# Patient Record
Sex: MALE | Race: BLACK OR AFRICAN AMERICAN | Employment: UNEMPLOYED | URBAN - METROPOLITAN AREA
[De-identification: names, ages, dates, MRNs, and addresses within clinical notes are randomized per-mention and may not be internally consistent; named-entity substitution may affect disease eponyms.]

---

## 2018-05-08 ENCOUNTER — HOSPITAL ENCOUNTER (OUTPATIENT)
Dept: LAB | Age: 63
Discharge: HOME | End: 2018-05-08
Payer: COMMERCIAL

## 2018-05-08 LAB — VALPROATE SERPL-MCNC: 72 UG/ML (ref 50–100)

## 2018-05-08 PROCEDURE — 36415 COLL VENOUS BLD VENIPUNCTURE: CPT | Performed by: PSYCHIATRY & NEUROLOGY

## 2018-05-08 PROCEDURE — 80164 ASSAY DIPROPYLACETIC ACD TOT: CPT | Performed by: PSYCHIATRY & NEUROLOGY

## 2018-05-10 LAB
FAX TO INFO,FAXT: NORMAL
FAX TO NUMBER,FAXN: NORMAL

## 2018-11-01 ENCOUNTER — HOSPITAL ENCOUNTER (OUTPATIENT)
Dept: CT IMAGING | Age: 63
Discharge: HOME | End: 2018-11-01
Attending: PHYSICIAN ASSISTANT
Payer: MEDICARE

## 2018-11-01 DIAGNOSIS — R10.32 LLQ ABDOMINAL PAIN: ICD-10-CM

## 2018-11-01 PROCEDURE — 74177 CT ABD & PELVIS W/CONTRAST: CPT

## 2018-11-01 PROCEDURE — 74011000255 HC RX REV CODE- 255

## 2018-11-01 PROCEDURE — 74011636320 HC RX REV CODE- 636/320

## 2018-11-01 RX ORDER — BARIUM SULFATE 20 MG/ML
900 SUSPENSION ORAL
Status: COMPLETED | OUTPATIENT
Start: 2018-11-01 | End: 2018-11-01

## 2018-11-01 RX ADMIN — BARIUM SULFATE 900 ML: 20 SUSPENSION ORAL at 13:49

## 2018-11-01 RX ADMIN — IOPAMIDOL 100 ML: 612 INJECTION, SOLUTION INTRAVENOUS at 13:49

## 2018-12-26 ENCOUNTER — HOSPITAL ENCOUNTER (OUTPATIENT)
Dept: CT IMAGING | Age: 63
Discharge: HOME | End: 2018-12-26
Attending: FAMILY MEDICINE
Payer: MEDICARE

## 2018-12-26 DIAGNOSIS — R10.32 LEFT LOWER QUADRANT PAIN: ICD-10-CM

## 2018-12-26 PROCEDURE — 74177 CT ABD & PELVIS W/CONTRAST: CPT

## 2018-12-26 PROCEDURE — 74011000255 HC RX REV CODE- 255

## 2018-12-26 PROCEDURE — 74011636320 HC RX REV CODE- 636/320

## 2018-12-26 RX ORDER — BARIUM SULFATE 20 MG/ML
900 SUSPENSION ORAL
Status: DISCONTINUED | OUTPATIENT
Start: 2018-12-26 | End: 2018-12-26

## 2018-12-26 RX ORDER — BARIUM SULFATE 20 MG/ML
450 SUSPENSION ORAL
Status: COMPLETED | OUTPATIENT
Start: 2018-12-26 | End: 2018-12-26

## 2018-12-26 RX ADMIN — IOPAMIDOL 100 ML: 612 INJECTION, SOLUTION INTRAVENOUS at 15:56

## 2018-12-26 RX ADMIN — BARIUM SULFATE 450 ML: 20 SUSPENSION ORAL at 15:56

## 2019-09-26 ENCOUNTER — APPOINTMENT (OUTPATIENT)
Dept: GENERAL RADIOLOGY | Age: 64
DRG: 948 | End: 2019-09-26
Attending: EMERGENCY MEDICINE
Payer: MEDICARE

## 2019-09-26 ENCOUNTER — APPOINTMENT (OUTPATIENT)
Dept: VASCULAR SURGERY | Age: 64
DRG: 948 | End: 2019-09-26
Attending: EMERGENCY MEDICINE
Payer: MEDICARE

## 2019-09-26 ENCOUNTER — APPOINTMENT (OUTPATIENT)
Dept: CT IMAGING | Age: 64
DRG: 948 | End: 2019-09-26
Attending: EMERGENCY MEDICINE
Payer: MEDICARE

## 2019-09-26 ENCOUNTER — HOSPITAL ENCOUNTER (INPATIENT)
Age: 64
LOS: 4 days | Discharge: HOME HEALTH | DRG: 948 | End: 2019-09-30
Attending: EMERGENCY MEDICINE | Admitting: FAMILY MEDICINE
Payer: MEDICARE

## 2019-09-26 ENCOUNTER — APPOINTMENT (OUTPATIENT)
Dept: NUCLEAR MEDICINE | Age: 64
DRG: 948 | End: 2019-09-26
Attending: EMERGENCY MEDICINE
Payer: MEDICARE

## 2019-09-26 DIAGNOSIS — R60.0 BILATERAL LEG EDEMA: ICD-10-CM

## 2019-09-26 DIAGNOSIS — R06.02 SOB (SHORTNESS OF BREATH): Primary | ICD-10-CM

## 2019-09-26 PROBLEM — R60.9 EDEMA: Status: ACTIVE | Noted: 2019-09-26

## 2019-09-26 LAB
ALBUMIN SERPL-MCNC: 4 G/DL (ref 3.4–5)
ALBUMIN/GLOB SERPL: 1.1 {RATIO} (ref 0.8–1.7)
ALP SERPL-CCNC: 76 U/L (ref 45–117)
ALT SERPL-CCNC: 18 U/L (ref 16–61)
ANION GAP SERPL CALC-SCNC: 7 MMOL/L (ref 3–18)
APPEARANCE UR: CLEAR
AST SERPL-CCNC: 16 U/L (ref 10–38)
BASOPHILS # BLD: 0 K/UL (ref 0–0.1)
BASOPHILS NFR BLD: 0 % (ref 0–2)
BILIRUB SERPL-MCNC: 0.7 MG/DL (ref 0.2–1)
BILIRUB UR QL: NEGATIVE
BNP SERPL-MCNC: 26 PG/ML (ref 0–900)
BUN SERPL-MCNC: 27 MG/DL (ref 7–18)
BUN/CREAT SERPL: 16 (ref 12–20)
CALCIUM SERPL-MCNC: 9.2 MG/DL (ref 8.5–10.1)
CHLORIDE SERPL-SCNC: 107 MMOL/L (ref 100–111)
CK MB CFR SERPL CALC: 0.5 % (ref 0–4)
CK MB SERPL-MCNC: 2 NG/ML (ref 5–25)
CK SERPL-CCNC: 408 U/L (ref 39–308)
CO2 SERPL-SCNC: 26 MMOL/L (ref 21–32)
COLOR UR: YELLOW
CREAT SERPL-MCNC: 1.64 MG/DL (ref 0.6–1.3)
D DIMER PPP FEU-MCNC: 1.02 UG/ML(FEU)
DIFFERENTIAL METHOD BLD: ABNORMAL
EOSINOPHIL # BLD: 0 K/UL (ref 0–0.4)
EOSINOPHIL NFR BLD: 1 % (ref 0–5)
ERYTHROCYTE [DISTWIDTH] IN BLOOD BY AUTOMATED COUNT: 12.3 % (ref 11.6–14.5)
GLOBULIN SER CALC-MCNC: 3.7 G/DL (ref 2–4)
GLUCOSE SERPL-MCNC: 87 MG/DL (ref 74–99)
GLUCOSE UR STRIP.AUTO-MCNC: NEGATIVE MG/DL
HCT VFR BLD AUTO: 41.4 % (ref 36–48)
HGB BLD-MCNC: 13.3 G/DL (ref 13–16)
HGB UR QL STRIP: NEGATIVE
KETONES UR QL STRIP.AUTO: NEGATIVE MG/DL
LEUKOCYTE ESTERASE UR QL STRIP.AUTO: NEGATIVE
LYMPHOCYTES # BLD: 1.7 K/UL (ref 0.9–3.6)
LYMPHOCYTES NFR BLD: 29 % (ref 21–52)
MCH RBC QN AUTO: 28.6 PG (ref 24–34)
MCHC RBC AUTO-ENTMCNC: 32.1 G/DL (ref 31–37)
MCV RBC AUTO: 89 FL (ref 74–97)
MONOCYTES # BLD: 0.5 K/UL (ref 0.05–1.2)
MONOCYTES NFR BLD: 8 % (ref 3–10)
NEUTS SEG # BLD: 3.5 K/UL (ref 1.8–8)
NEUTS SEG NFR BLD: 62 % (ref 40–73)
NITRITE UR QL STRIP.AUTO: NEGATIVE
PH UR STRIP: 7 [PH] (ref 5–8)
PLATELET # BLD AUTO: 184 K/UL (ref 135–420)
PMV BLD AUTO: 9.3 FL (ref 9.2–11.8)
POTASSIUM SERPL-SCNC: 3.6 MMOL/L (ref 3.5–5.5)
PROT SERPL-MCNC: 7.7 G/DL (ref 6.4–8.2)
PROT UR STRIP-MCNC: NEGATIVE MG/DL
RBC # BLD AUTO: 4.65 M/UL (ref 4.7–5.5)
SODIUM SERPL-SCNC: 140 MMOL/L (ref 136–145)
SP GR UR REFRACTOMETRY: 1.02 (ref 1–1.03)
TROPONIN I SERPL-MCNC: <0.02 NG/ML (ref 0–0.04)
TROPONIN I SERPL-MCNC: <0.02 NG/ML (ref 0–0.04)
UROBILINOGEN UR QL STRIP.AUTO: 1 EU/DL (ref 0.2–1)
WBC # BLD AUTO: 5.7 K/UL (ref 4.6–13.2)

## 2019-09-26 PROCEDURE — 82550 ASSAY OF CK (CPK): CPT

## 2019-09-26 PROCEDURE — 74176 CT ABD & PELVIS W/O CONTRAST: CPT

## 2019-09-26 PROCEDURE — 71046 X-RAY EXAM CHEST 2 VIEWS: CPT

## 2019-09-26 PROCEDURE — 81003 URINALYSIS AUTO W/O SCOPE: CPT

## 2019-09-26 PROCEDURE — 93970 EXTREMITY STUDY: CPT

## 2019-09-26 PROCEDURE — 74011250636 HC RX REV CODE- 250/636: Performed by: EMERGENCY MEDICINE

## 2019-09-26 PROCEDURE — 65660000000 HC RM CCU STEPDOWN

## 2019-09-26 PROCEDURE — 85379 FIBRIN DEGRADATION QUANT: CPT

## 2019-09-26 PROCEDURE — 71250 CT THORAX DX C-: CPT

## 2019-09-26 PROCEDURE — 80053 COMPREHEN METABOLIC PANEL: CPT

## 2019-09-26 PROCEDURE — 96374 THER/PROPH/DIAG INJ IV PUSH: CPT

## 2019-09-26 PROCEDURE — 99285 EMERGENCY DEPT VISIT HI MDM: CPT

## 2019-09-26 PROCEDURE — 85025 COMPLETE CBC W/AUTO DIFF WBC: CPT

## 2019-09-26 PROCEDURE — 74011250637 HC RX REV CODE- 250/637: Performed by: EMERGENCY MEDICINE

## 2019-09-26 PROCEDURE — 94762 N-INVAS EAR/PLS OXIMTRY CONT: CPT

## 2019-09-26 PROCEDURE — 83880 ASSAY OF NATRIURETIC PEPTIDE: CPT

## 2019-09-26 PROCEDURE — 93005 ELECTROCARDIOGRAM TRACING: CPT

## 2019-09-26 RX ORDER — TAMSULOSIN HYDROCHLORIDE 0.4 MG/1
0.4 CAPSULE ORAL DAILY
COMMUNITY

## 2019-09-26 RX ORDER — BUSPIRONE HYDROCHLORIDE 5 MG/1
5 TABLET ORAL
COMMUNITY

## 2019-09-26 RX ORDER — AMLODIPINE AND BENAZEPRIL HYDROCHLORIDE 5; 10 MG/1; MG/1
1 CAPSULE ORAL DAILY
Status: ON HOLD | COMMUNITY
End: 2019-09-27

## 2019-09-26 RX ORDER — PANTOPRAZOLE SODIUM 40 MG/1
40 GRANULE, DELAYED RELEASE ORAL DAILY
Status: ON HOLD | COMMUNITY
End: 2019-09-27

## 2019-09-26 RX ORDER — GUAIFENESIN 100 MG/5ML
324 LIQUID (ML) ORAL
Status: COMPLETED | OUTPATIENT
Start: 2019-09-26 | End: 2019-09-26

## 2019-09-26 RX ORDER — AMLODIPINE BESYLATE 10 MG/1
5 TABLET ORAL DAILY
Status: ON HOLD | COMMUNITY
End: 2019-09-28

## 2019-09-26 RX ORDER — FLUOXETINE HYDROCHLORIDE 40 MG/1
40 CAPSULE ORAL DAILY
COMMUNITY

## 2019-09-26 RX ORDER — FUROSEMIDE 10 MG/ML
40 INJECTION INTRAMUSCULAR; INTRAVENOUS
Status: COMPLETED | OUTPATIENT
Start: 2019-09-26 | End: 2019-09-26

## 2019-09-26 RX ORDER — OLANZAPINE 5 MG/1
5 TABLET ORAL
COMMUNITY

## 2019-09-26 RX ORDER — TOPIRAMATE 100 MG/1
100 TABLET, FILM COATED ORAL 2 TIMES DAILY
COMMUNITY

## 2019-09-26 RX ORDER — ATORVASTATIN CALCIUM 10 MG/1
10 TABLET, FILM COATED ORAL DAILY
COMMUNITY

## 2019-09-26 RX ORDER — LORAZEPAM 0.5 MG/1
0.5 TABLET ORAL 3 TIMES DAILY
COMMUNITY

## 2019-09-26 RX ADMIN — ASPIRIN 81 MG 324 MG: 81 TABLET ORAL at 22:16

## 2019-09-26 RX ADMIN — FUROSEMIDE 40 MG: 10 INJECTION, SOLUTION INTRAMUSCULAR; INTRAVENOUS at 17:59

## 2019-09-26 NOTE — ED TRIAGE NOTES
Pt has had worsening shortness of breath over past few weeks. Seen by pcp who believes it may be chf. Started on po lasix.  Sob and swelling in legs not improving
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
## 2019-09-27 ENCOUNTER — APPOINTMENT (OUTPATIENT)
Dept: NON INVASIVE DIAGNOSTICS | Age: 64
DRG: 948 | End: 2019-09-27
Attending: FAMILY MEDICINE
Payer: MEDICARE

## 2019-09-27 LAB
ALBUMIN SERPL-MCNC: 3.9 G/DL (ref 3.4–5)
ALBUMIN/GLOB SERPL: 1.1 {RATIO} (ref 0.8–1.7)
ALP SERPL-CCNC: 80 U/L (ref 45–117)
ALT SERPL-CCNC: 18 U/L (ref 16–61)
ANION GAP SERPL CALC-SCNC: 7 MMOL/L (ref 3–18)
AST SERPL-CCNC: 20 U/L (ref 10–38)
ATRIAL RATE: 61 BPM
BILIRUB SERPL-MCNC: 0.7 MG/DL (ref 0.2–1)
BUN SERPL-MCNC: 26 MG/DL (ref 7–18)
BUN/CREAT SERPL: 19 (ref 12–20)
CALCIUM SERPL-MCNC: 9.1 MG/DL (ref 8.5–10.1)
CALCULATED P AXIS, ECG09: 64 DEGREES
CALCULATED R AXIS, ECG10: 2 DEGREES
CALCULATED T AXIS, ECG11: 25 DEGREES
CHLORIDE SERPL-SCNC: 107 MMOL/L (ref 100–111)
CK MB CFR SERPL CALC: 0.3 % (ref 0–4)
CK MB SERPL-MCNC: 2 NG/ML (ref 5–25)
CK SERPL-CCNC: 748 U/L (ref 39–308)
CO2 SERPL-SCNC: 28 MMOL/L (ref 21–32)
CREAT SERPL-MCNC: 1.37 MG/DL (ref 0.6–1.3)
DIAGNOSIS, 93000: NORMAL
ECHO AO ASC DIAM: 2.95 CM
ECHO AO ROOT DIAM: 2.61 CM
ECHO AV MEAN GRADIENT: 2.5 MMHG
ECHO AV PEAK GRADIENT: 4.6 MMHG
ECHO AV PEAK VELOCITY: 106.92 CM/S
ECHO AV VTI: 22.31 CM
ECHO LA MAJOR AXIS: 3.32 CM
ECHO LA VOL 2C: 22.45 ML (ref 18–58)
ECHO LA VOL 4C: 24.8 ML (ref 18–58)
ECHO LA VOL BP: 28.95 ML (ref 18–58)
ECHO LA VOL/BSA BIPLANE: 15.03 ML/M2 (ref 16–28)
ECHO LA VOLUME INDEX A2C: 11.65 ML/M2 (ref 16–28)
ECHO LA VOLUME INDEX A4C: 12.87 ML/M2 (ref 16–28)
ECHO LV E' LATERAL VELOCITY: 8 CM/S
ECHO LV E' SEPTAL VELOCITY: 6 CM/S
ECHO LV EDV A2C: 47.9 ML
ECHO LV EDV A4C: 63.9 ML
ECHO LV EDV BP: 56.2 ML (ref 67–155)
ECHO LV EDV INDEX A4C: 33.2 ML/M2
ECHO LV EDV INDEX BP: 29.2 ML/M2
ECHO LV EDV NDEX A2C: 24.9 ML/M2
ECHO LV EJECTION FRACTION A2C: 61 %
ECHO LV EJECTION FRACTION A4C: 64 %
ECHO LV EJECTION FRACTION BIPLANE: 62.1 % (ref 55–100)
ECHO LV ESV A2C: 18.8 ML
ECHO LV ESV A4C: 23 ML
ECHO LV ESV BP: 21.3 ML (ref 22–58)
ECHO LV ESV INDEX A2C: 9.8 ML/M2
ECHO LV ESV INDEX A4C: 11.9 ML/M2
ECHO LV ESV INDEX BP: 11.1 ML/M2
ECHO LV INTERNAL DIMENSION DIASTOLIC: 4.24 CM (ref 4.2–5.9)
ECHO LV INTERNAL DIMENSION SYSTOLIC: 2.02 CM
ECHO LV IVSD: 1.16 CM (ref 0.6–1)
ECHO LV MASS 2D: 184.2 G (ref 88–224)
ECHO LV MASS INDEX 2D: 95.6 G/M2 (ref 49–115)
ECHO LV POSTERIOR WALL DIASTOLIC: 1.03 CM (ref 0.6–1)
ECHO LVOT DIAM: 1.99 CM
ECHO MV A VELOCITY: 76.27 CM/S
ECHO MV AREA PHT: 18.8 CM2
ECHO MV E DECELERATION TIME (DT): 40.3 MS
ECHO MV E VELOCITY: 67.28 CM/S
ECHO MV E/A RATIO: 0.88
ECHO MV E/E' LATERAL: 8.41
ECHO MV E/E' RATIO (AVERAGED): 9.81
ECHO MV E/E' SEPTAL: 11.21
ECHO MV PRESSURE HALF TIME (PHT): 11.7 MS
ECHO PV MAX VELOCITY: 103.88 CM/S
ECHO PV PEAK GRADIENT: 4.3 MMHG
ECHO RA AREA 4C: 10.44 CM2
ECHO RV INTERNAL DIMENSION: 3.6 CM
ECHO TV REGURGITANT MAX VELOCITY: 160.15 CM/S
ECHO TV REGURGITANT PEAK GRADIENT: 10.3 MMHG
ERYTHROCYTE [DISTWIDTH] IN BLOOD BY AUTOMATED COUNT: 12.3 % (ref 11.6–14.5)
GLOBULIN SER CALC-MCNC: 3.5 G/DL (ref 2–4)
GLUCOSE SERPL-MCNC: 80 MG/DL (ref 74–99)
HCT VFR BLD AUTO: 45 % (ref 36–48)
HGB BLD-MCNC: 14.7 G/DL (ref 13–16)
MCH RBC QN AUTO: 29.1 PG (ref 24–34)
MCHC RBC AUTO-ENTMCNC: 32.7 G/DL (ref 31–37)
MCV RBC AUTO: 89.1 FL (ref 74–97)
P-R INTERVAL, ECG05: 152 MS
PLATELET # BLD AUTO: 175 K/UL (ref 135–420)
PMV BLD AUTO: 9.9 FL (ref 9.2–11.8)
POTASSIUM SERPL-SCNC: 3.4 MMOL/L (ref 3.5–5.5)
PROT SERPL-MCNC: 7.4 G/DL (ref 6.4–8.2)
Q-T INTERVAL, ECG07: 410 MS
QRS DURATION, ECG06: 84 MS
QTC CALCULATION (BEZET), ECG08: 412 MS
RBC # BLD AUTO: 5.05 M/UL (ref 4.7–5.5)
SODIUM SERPL-SCNC: 142 MMOL/L (ref 136–145)
TROPONIN I SERPL-MCNC: <0.02 NG/ML (ref 0–0.04)
VENTRICULAR RATE, ECG03: 61 BPM
WBC # BLD AUTO: 4.9 K/UL (ref 4.6–13.2)

## 2019-09-27 PROCEDURE — 65660000000 HC RM CCU STEPDOWN

## 2019-09-27 PROCEDURE — 93306 TTE W/DOPPLER COMPLETE: CPT

## 2019-09-27 PROCEDURE — 85027 COMPLETE CBC AUTOMATED: CPT

## 2019-09-27 PROCEDURE — 74011250636 HC RX REV CODE- 250/636: Performed by: FAMILY MEDICINE

## 2019-09-27 PROCEDURE — 82550 ASSAY OF CK (CPK): CPT

## 2019-09-27 PROCEDURE — 80053 COMPREHEN METABOLIC PANEL: CPT

## 2019-09-27 PROCEDURE — 36415 COLL VENOUS BLD VENIPUNCTURE: CPT

## 2019-09-27 RX ORDER — HEPARIN SODIUM 5000 [USP'U]/ML
5000 INJECTION, SOLUTION INTRAVENOUS; SUBCUTANEOUS EVERY 8 HOURS
Status: DISCONTINUED | OUTPATIENT
Start: 2019-09-27 | End: 2019-09-30 | Stop reason: HOSPADM

## 2019-09-27 RX ORDER — CALCIUM POLYCARBOPHIL 625 MG
1250 TABLET ORAL
Status: ON HOLD | COMMUNITY
End: 2019-09-27 | Stop reason: CLARIF

## 2019-09-27 RX ORDER — POTASSIUM CHLORIDE 750 MG/1
10 TABLET, FILM COATED, EXTENDED RELEASE ORAL DAILY
Status: ON HOLD | COMMUNITY
End: 2019-09-28

## 2019-09-27 RX ORDER — FUROSEMIDE 10 MG/ML
20 INJECTION INTRAMUSCULAR; INTRAVENOUS DAILY
Status: DISCONTINUED | OUTPATIENT
Start: 2019-09-27 | End: 2019-09-29

## 2019-09-27 RX ORDER — BROMPHENIRAMINE MALEATE, DEXTROMETHORPHAN HBR, PHENYLEPHRINE HCL, DIPHENHYDRAMINE HCL, PHENYLEPHRINE HCL 0.52G
2 KIT ORAL
COMMUNITY

## 2019-09-27 RX ORDER — FUROSEMIDE 20 MG/1
40 TABLET ORAL DAILY
Refills: 0 | Status: ON HOLD | COMMUNITY
Start: 2019-09-05 | End: 2019-09-28

## 2019-09-27 RX ORDER — PANTOPRAZOLE SODIUM 40 MG/1
40 TABLET, DELAYED RELEASE ORAL DAILY
COMMUNITY

## 2019-09-27 RX ORDER — DIVALPROEX SODIUM 500 MG/1
500 TABLET, DELAYED RELEASE ORAL EVERY 12 HOURS
COMMUNITY

## 2019-09-27 RX ORDER — IBUPROFEN 600 MG/1
600 TABLET ORAL
Status: ON HOLD | COMMUNITY
End: 2019-09-28

## 2019-09-27 RX ORDER — MECLIZINE HYDROCHLORIDE 25 MG/1
25 TABLET ORAL
COMMUNITY

## 2019-09-27 RX ADMIN — FUROSEMIDE 20 MG: 10 INJECTION, SOLUTION INTRAMUSCULAR; INTRAVENOUS at 04:51

## 2019-09-27 RX ADMIN — HEPARIN SODIUM 5000 UNITS: 5000 INJECTION, SOLUTION INTRAVENOUS; SUBCUTANEOUS at 21:42

## 2019-09-27 RX ADMIN — HEPARIN SODIUM 5000 UNITS: 5000 INJECTION, SOLUTION INTRAVENOUS; SUBCUTANEOUS at 13:49

## 2019-09-27 RX ADMIN — HEPARIN SODIUM 5000 UNITS: 5000 INJECTION, SOLUTION INTRAVENOUS; SUBCUTANEOUS at 02:16

## 2019-09-27 NOTE — PROGRESS NOTES
Problem: Pain  Goal: *Control of Pain  Outcome: Progressing Towards Goal  Goal: *PALLIATIVE CARE:  Alleviation of Pain  Outcome: Progressing Towards Goal     Problem: Patient Education: Go to Patient Education Activity  Goal: Patient/Family Education  Outcome: Progressing Towards Goal     Problem: Falls - Risk of  Goal: *Absence of Falls  Description  Document Almita Hilliard Fall Risk and appropriate interventions in the flowsheet.   Outcome: Progressing Towards Goal  Note:   Fall Risk Interventions:                 Elimination Interventions: Call light in reach, Patient to call for help with toileting needs              Problem: Patient Education: Go to Patient Education Activity  Goal: Patient/Family Education  Outcome: Progressing Towards Goal

## 2019-09-27 NOTE — PROGRESS NOTES
0731:Received verbal bedside report from off going nurse MANUELITO Pickering R.N. Patient care received. Patient alert and oriented x 4 with periodic confusion. Patient resting in bed denies pain. Patient stable. Call light with in reach bed in lowest position. 1119: Made  aware patient's potassium level was 3.4. No further orders received at this time.

## 2019-09-27 NOTE — PROGRESS NOTES
Problem: Pain  Goal: *Control of Pain  Outcome: Progressing Towards Goal  Goal: *PALLIATIVE CARE:  Alleviation of Pain  Outcome: Progressing Towards Goal     Problem: Patient Education: Go to Patient Education Activity  Goal: Patient/Family Education  Outcome: Progressing Towards Goal     Problem: Patient Education: Go to Patient Education Activity  Goal: Patient/Family Education  Outcome: Progressing Towards Goal     Problem:  Activity Intolerance  Goal: *Oxygen saturation during activity within specified parameters  Outcome: Progressing Towards Goal  Goal: *Able to remain out of bed as prescribed  Outcome: Progressing Towards Goal     Problem: Patient Education: Go to Patient Education Activity  Goal: Patient/Family Education  Outcome: Progressing Towards Goal

## 2019-09-27 NOTE — PROGRESS NOTES
2230 : TRANSFER - IN REPORT:    Verbal report received from JONATHAN Candelaria RN(name) on Kenneth Urrutia  being received from ED(unit) for routine progression of care     Report consisted of patients Situation, Background, Assessment and   Recommendations(SBAR). Information from the following report(s) SBAR, Kardex, ED Summary, MAR, Accordion and Recent Results was reviewed with the receiving nurse. Opportunity for questions and clarification was provided. Assessment completed upon patients arrival to unit and care assumed. 0005: Purposeful rounding completed. Patient resting comfortably in bed in no acute distress. Call light is within reach. 1334: I Spoke with caregiver and she will PTA medications list.     0730: Shift was uneventful. Patient resting comfortably in bed at shift change. Bedside and Verbal shift change report given to [address] (oncoming nurse) by Favian Santana RN (offgoing nurse). Report included the following information SBAR, Kardex, Intake/Output, MAR, Accordion, Recent Results and Med Rec Status.

## 2019-09-27 NOTE — PROGRESS NOTES
Admission Medication Reconciliation has been performed on this patient  admitted through the ED yesterday consisting of interview of the patient regarding their PTA Home Medication List, Allergies and PMH as well as obtaining outpatient pharmacy information. Chief complaint:   Chief Complaint   Patient presents with    Shortness of Breath       Pt has PMH of   Past Medical History:   Diagnosis Date    Psychiatric disorder     ID       Allergies consist of: Patient has no known allergies. Pt currently unavailable to interview  Patient did not provide a written list of home medications. Added/updated outpatient pharmacy to include: [facility] and [facility] was not marked complete and did  require modification. PAML has yet to be reviewed by prescriber. Chart notes state pt is poor historian and awaiting caregiver to provide home med list.     Medication Compliance Issues and/or Medication Concerns:   Unable to obtain med list from PCP as had to leave a msg d/t all RN's in a staff meeting. Spoke tabitha/ Kailey @ [address] [place] [address]  [phone] and RPh at [facility] to obtain current RX info. Updated PAML accordingly. Pt gets meds from [facility], [facility]. Dr. Ines Ontiveros and mental health provider Dr. Clementina Caballero [phone]. Will notify RN that Marilou Ledbetter is as complete as possible.              Papi Garay [facility], [place]    Clinical Pharmacist  [phone]

## 2019-09-27 NOTE — PROGRESS NOTES
Obtained MAR from caregiver and interviewed her (she admitted herself today to the ED w/ ?sinus infection?). She is a poor historian as she is in pain. The [facility] given to me has med administrations documented as being given up to tomorrow's date? ? Pt was admitted here yesterday. Lasix and Kdur do not appear on the [facility]. MAR has flomax BID on it yet PCP and outpt pharmacies have listed daily? Spoke with CRISTÓBAL Campos at Dr. Garrett Barrett office to confirm doses of lasix, kdur, norvasc and flomax. Spoke w/ Dr. Tray Deras regarding Erie Garbe now complete.     [address] [place] [address] Pharmacist  [phone]

## 2019-09-27 NOTE — PROGRESS NOTES
Reason for Admission:    Rachelle Agarwal is a 59 y.o. male with PMHX of psychiatric disorder who presents to the emergency department C/O SOB. Associated sxs include LE edema. Pt denies chest pain, and any other sxs or complaints. Patient is being followed by Dr. Cummins Call for progressive edema. He said progressive edema with shortness of breath over the last 2 weeks with orthopnea. He was given Lasix without improvement.     PCP: Jag Deshpande MD                    RRAT Score:       9              Plan for utilizing home health:     tbd                     Current Advanced Directive/Advance Care Plan: not on file                         Transition of Care Plan:          Met with patient at bedside. states he use to live in his own home but currently lives with a couple. Patient states he has medicare for insurance. He has Dr. Cummins Call for pcp. He cannot remember his psychiatrist name. Cm attempted to call    Darek Solomon [phone]     However unabe to leave message. Patient reports he plans to return home when d/c. Patient reports he is IADL's. Cm will continue to follow no pans for dc today  Care Management Interventions  PCP Verified by CM: Yes  Transition of Care Consult (CM Consult): Discharge Planning  Current Support Network:  Other  Plan discussed with Pt/Family/Caregiver: Yes  Freedom of Choice Offered: Yes  Discharge Location  Discharge Placement: Other:

## 2019-09-27 NOTE — PROGRESS NOTES
NUTRITION SCREENING      RD ASSESSMENT/PLAN:     Diet:  regular Height: 5' 5\" (165.1 cm)     Food Allergies: n/a Weight: 85.3 kg (188 lb)    PO Intake:  No data found. BMI: 31.3 kg/m^2 is  obese (30%-39.9% BMI)      PMH: psychiatric disorder    Current Hospital Problems: edema, SOB     Nutrition intervention not currently indicated. Pt is not at nutritional risk at this time. Will rescreen per policy.      REASON FOR ASSESSMENT:   [x]  RN Referral:    [x] MST score >/=2  Malnutrition Screening Tool (MST):  Recently Lost Weight Without Trying: Unsure     Eating Poorly Due to Decreased Appetite: No  MST Score: 2    [] Enteral/Parenteral Nutrition PTA   [] Pregnant (Not in Labor):  [] Gestational DM     [] Multigestation   [] Pressure Ulcer/Wound Care needs  [] Positive Nutrition Screen:  [] BMI <18.5  [] NPO/Clear Liquid Diet > 5 days (>3 days in ICU)  [] New Order for TPN  [] LOS  [] ICU admission      Christy Arguello RD  Pager: [phone]

## 2019-09-27 NOTE — ROUTINE PROCESS
TRANSFER - OUT REPORT: 
 
Verbal report given to Ann RN(name) on Donovan Garibay  being transferred to (unit) for routine progression of care Report consisted of patients Situation, Background, Assessment and  
Recommendations(SBAR). Information from the following report(s) SBAR, ED Summary, [facility] and Cardiac Rhythm Sinus rae was reviewed with the receiving nurse. Lines:  
Peripheral IV 09/26/19 Right Antecubital (Active) Site Assessment Clean, dry, & intact 9/26/2019  5:30 PM  
Phlebitis Assessment 0 9/26/2019  5:30 PM  
Infiltration Assessment 0 9/26/2019  5:30 PM  
Dressing Status Clean, dry, & intact 9/26/2019  5:30 PM  
Dressing Type Transparent 9/26/2019  5:30 PM  
Hub Color/Line Status Pink;Flushed;Patent 9/26/2019  5:30 PM  
Action Taken Blood drawn 9/26/2019  5:30 PM  
Alcohol Cap Used No 9/26/2019  5:30 PM  
  
 
Opportunity for questions and clarification was provided. Patient transported with: 
 Monitor Tech
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
## 2019-09-28 LAB
ALBUMIN SERPL-MCNC: 3.5 G/DL (ref 3.4–5)
ALBUMIN/GLOB SERPL: 1 {RATIO} (ref 0.8–1.7)
ALP SERPL-CCNC: 75 U/L (ref 45–117)
ALT SERPL-CCNC: 18 U/L (ref 16–61)
ANION GAP SERPL CALC-SCNC: 5 MMOL/L (ref 3–18)
AST SERPL-CCNC: 19 U/L (ref 10–38)
BILIRUB SERPL-MCNC: 0.4 MG/DL (ref 0.2–1)
BUN SERPL-MCNC: 33 MG/DL (ref 7–18)
BUN/CREAT SERPL: 23 (ref 12–20)
CALCIUM SERPL-MCNC: 9.1 MG/DL (ref 8.5–10.1)
CHLORIDE SERPL-SCNC: 109 MMOL/L (ref 100–111)
CO2 SERPL-SCNC: 28 MMOL/L (ref 21–32)
CREAT SERPL-MCNC: 1.41 MG/DL (ref 0.6–1.3)
ERYTHROCYTE [DISTWIDTH] IN BLOOD BY AUTOMATED COUNT: 12.2 % (ref 11.6–14.5)
GLOBULIN SER CALC-MCNC: 3.4 G/DL (ref 2–4)
GLUCOSE BLD STRIP.AUTO-MCNC: 103 MG/DL (ref 70–110)
GLUCOSE SERPL-MCNC: 98 MG/DL (ref 74–99)
HCT VFR BLD AUTO: 43.2 % (ref 36–48)
HGB BLD-MCNC: 13.8 G/DL (ref 13–16)
MCH RBC QN AUTO: 28.5 PG (ref 24–34)
MCHC RBC AUTO-ENTMCNC: 31.9 G/DL (ref 31–37)
MCV RBC AUTO: 89.3 FL (ref 74–97)
PLATELET # BLD AUTO: 181 K/UL (ref 135–420)
PMV BLD AUTO: 9.9 FL (ref 9.2–11.8)
POTASSIUM SERPL-SCNC: 3.7 MMOL/L (ref 3.5–5.5)
PROT SERPL-MCNC: 6.9 G/DL (ref 6.4–8.2)
RBC # BLD AUTO: 4.84 M/UL (ref 4.7–5.5)
SODIUM SERPL-SCNC: 142 MMOL/L (ref 136–145)
WBC # BLD AUTO: 5.6 K/UL (ref 4.6–13.2)

## 2019-09-28 PROCEDURE — 65660000000 HC RM CCU STEPDOWN

## 2019-09-28 PROCEDURE — 82962 GLUCOSE BLOOD TEST: CPT

## 2019-09-28 PROCEDURE — 80053 COMPREHEN METABOLIC PANEL: CPT

## 2019-09-28 PROCEDURE — 36415 COLL VENOUS BLD VENIPUNCTURE: CPT

## 2019-09-28 PROCEDURE — 74011250637 HC RX REV CODE- 250/637: Performed by: INTERNAL MEDICINE

## 2019-09-28 PROCEDURE — 74011250636 HC RX REV CODE- 250/636: Performed by: FAMILY MEDICINE

## 2019-09-28 PROCEDURE — 85027 COMPLETE CBC AUTOMATED: CPT

## 2019-09-28 RX ORDER — TOPIRAMATE 100 MG/1
100 TABLET, FILM COATED ORAL 2 TIMES DAILY
Status: DISCONTINUED | OUTPATIENT
Start: 2019-09-28 | End: 2019-09-30 | Stop reason: HOSPADM

## 2019-09-28 RX ORDER — TAMSULOSIN HYDROCHLORIDE 0.4 MG/1
0.4 CAPSULE ORAL DAILY
Status: DISCONTINUED | OUTPATIENT
Start: 2019-09-28 | End: 2019-09-30 | Stop reason: HOSPADM

## 2019-09-28 RX ORDER — LORAZEPAM 0.5 MG/1
0.5 TABLET ORAL 3 TIMES DAILY
Status: DISCONTINUED | OUTPATIENT
Start: 2019-09-28 | End: 2019-09-30 | Stop reason: HOSPADM

## 2019-09-28 RX ORDER — PANTOPRAZOLE SODIUM 40 MG/1
40 TABLET, DELAYED RELEASE ORAL DAILY
Status: DISCONTINUED | OUTPATIENT
Start: 2019-09-28 | End: 2019-09-30 | Stop reason: HOSPADM

## 2019-09-28 RX ORDER — POLYETHYLENE GLYCOL 3350 17 G/17G
17 POWDER, FOR SOLUTION ORAL DAILY
Status: DISCONTINUED | OUTPATIENT
Start: 2019-09-28 | End: 2019-09-30 | Stop reason: HOSPADM

## 2019-09-28 RX ORDER — FLUOXETINE HYDROCHLORIDE 20 MG/1
40 CAPSULE ORAL DAILY
Status: DISCONTINUED | OUTPATIENT
Start: 2019-09-28 | End: 2019-09-30 | Stop reason: HOSPADM

## 2019-09-28 RX ORDER — ATORVASTATIN CALCIUM 10 MG/1
10 TABLET, FILM COATED ORAL DAILY
Status: DISCONTINUED | OUTPATIENT
Start: 2019-09-28 | End: 2019-09-30 | Stop reason: HOSPADM

## 2019-09-28 RX ORDER — BUSPIRONE HYDROCHLORIDE 5 MG/1
5 TABLET ORAL
Status: DISCONTINUED | OUTPATIENT
Start: 2019-09-28 | End: 2019-09-30 | Stop reason: HOSPADM

## 2019-09-28 RX ORDER — OLANZAPINE 10 MG/1
5 TABLET ORAL
Status: DISCONTINUED | OUTPATIENT
Start: 2019-09-28 | End: 2019-09-30 | Stop reason: HOSPADM

## 2019-09-28 RX ORDER — DIVALPROEX SODIUM 250 MG/1
500 TABLET, DELAYED RELEASE ORAL EVERY 12 HOURS
Status: DISCONTINUED | OUTPATIENT
Start: 2019-09-28 | End: 2019-09-30 | Stop reason: HOSPADM

## 2019-09-28 RX ORDER — MECLIZINE HCL 12.5 MG 12.5 MG/1
25 TABLET ORAL
Status: DISCONTINUED | OUTPATIENT
Start: 2019-09-28 | End: 2019-09-30 | Stop reason: HOSPADM

## 2019-09-28 RX ADMIN — PANTOPRAZOLE SODIUM 40 MG: 40 TABLET, DELAYED RELEASE ORAL at 10:37

## 2019-09-28 RX ADMIN — POLYETHYLENE GLYCOL 3350 17 G: 17 POWDER, FOR SOLUTION ORAL at 11:18

## 2019-09-28 RX ADMIN — LORAZEPAM 0.5 MG: 0.5 TABLET ORAL at 16:46

## 2019-09-28 RX ADMIN — HEPARIN SODIUM 5000 UNITS: 5000 INJECTION, SOLUTION INTRAVENOUS; SUBCUTANEOUS at 16:46

## 2019-09-28 RX ADMIN — PSYLLIUM HUSK 1 PACKET: 3.4 POWDER ORAL at 11:18

## 2019-09-28 RX ADMIN — LORAZEPAM 0.5 MG: 0.5 TABLET ORAL at 22:55

## 2019-09-28 RX ADMIN — DIVALPROEX SODIUM 500 MG: 250 TABLET, DELAYED RELEASE ORAL at 10:36

## 2019-09-28 RX ADMIN — OLANZAPINE 5 MG: 10 TABLET, FILM COATED ORAL at 22:54

## 2019-09-28 RX ADMIN — LORAZEPAM 0.5 MG: 0.5 TABLET ORAL at 10:36

## 2019-09-28 RX ADMIN — TOPIRAMATE 100 MG: 100 TABLET, FILM COATED ORAL at 10:36

## 2019-09-28 RX ADMIN — PSYLLIUM HUSK 1 PACKET: 3.4 POWDER ORAL at 22:54

## 2019-09-28 RX ADMIN — HEPARIN SODIUM 5000 UNITS: 5000 INJECTION, SOLUTION INTRAVENOUS; SUBCUTANEOUS at 22:55

## 2019-09-28 RX ADMIN — FLUOXETINE 40 MG: 20 CAPSULE ORAL at 10:36

## 2019-09-28 RX ADMIN — BUSPIRONE HYDROCHLORIDE 5 MG: 5 TABLET ORAL at 11:18

## 2019-09-28 RX ADMIN — DIVALPROEX SODIUM 500 MG: 250 TABLET, DELAYED RELEASE ORAL at 22:55

## 2019-09-28 RX ADMIN — TAMSULOSIN HYDROCHLORIDE 0.4 MG: 0.4 CAPSULE ORAL at 10:36

## 2019-09-28 RX ADMIN — HEPARIN SODIUM 5000 UNITS: 5000 INJECTION, SOLUTION INTRAVENOUS; SUBCUTANEOUS at 06:41

## 2019-09-28 RX ADMIN — ATORVASTATIN CALCIUM 10 MG: 10 TABLET, FILM COATED ORAL at 10:36

## 2019-09-28 RX ADMIN — TOPIRAMATE 100 MG: 100 TABLET, FILM COATED ORAL at 22:55

## 2019-09-28 RX ADMIN — BUSPIRONE HYDROCHLORIDE 5 MG: 5 TABLET ORAL at 16:46

## 2019-09-28 RX ADMIN — FUROSEMIDE 20 MG: 10 INJECTION, SOLUTION INTRAMUSCULAR; INTRAVENOUS at 09:43

## 2019-09-28 NOTE — PROGRESS NOTES
Problem: Pain  Goal: *Control of Pain  Outcome: Progressing Towards Goal  Goal: *PALLIATIVE CARE:  Alleviation of Pain  Outcome: Progressing Towards Goal     Problem: Patient Education: Go to Patient Education Activity  Goal: Patient/Family Education  Outcome: Progressing Towards Goal     Problem: Falls - Risk of  Goal: *Absence of Falls  Description  Document Yany Bailey Fall Risk and appropriate interventions in the flowsheet.   Outcome: Progressing Towards Goal  Note:   Fall Risk Interventions:            Medication Interventions: Patient to call before getting OOB, Teach patient to arise slowly    Elimination Interventions: Call light in reach, Patient to call for help with toileting needs              Problem: Patient Education: Go to Patient Education Activity  Goal: Patient/Family Education  Outcome: Progressing Towards Goal

## 2019-09-28 NOTE — ROUTINE PROCESS
Bedside and Verbal shift change report given to Criss Weiss  (oncoming nurse) by Philippe Gamboa RN  (offgoing nurse). Report given with SBAR, Kardex, Intake/Output and Recent Results.

## 2019-09-28 NOTE — PROGRESS NOTES
Problem: Pain  Goal: *Control of Pain  Outcome: Progressing Towards Goal  Goal: *PALLIATIVE CARE:  Alleviation of Pain  Outcome: Progressing Towards Goal     Problem: Patient Education: Go to Patient Education Activity  Goal: Patient/Family Education  Outcome: Progressing Towards Goal     Problem: Falls - Risk of  Goal: *Absence of Falls  Description  Document Yenny Di Giorgio Fall Risk and appropriate interventions in the flowsheet. Outcome: Progressing Towards Goal  Note:   Fall Risk Interventions:                 Elimination Interventions: Call light in reach, Patient to call for help with toileting needs              Problem: Patient Education: Go to Patient Education Activity  Goal: Patient/Family Education  Outcome: Progressing Towards Goal     Problem:  Activity Intolerance  Goal: *Oxygen saturation during activity within specified parameters  Outcome: Progressing Towards Goal  Goal: *Able to remain out of bed as prescribed  Outcome: Progressing Towards Goal     Problem: Patient Education: Go to Patient Education Activity  Goal: Patient/Family Education  Outcome: Progressing Towards Goal

## 2019-09-28 NOTE — PROGRESS NOTES
1915 Assumed care from Judy Akbar RN.    2013 Assessment completed. 2035 Bedside and Verbal shift change report given to MADI Ascencio RN (oncoming nurse) by Yanique Blackburn RN   (offgoing nurse). Report included the following information SBAR, Kardex, ED Summary, Procedure Summary, Intake/Output, MAR, Recent Results and Med Rec Status.

## 2019-09-28 NOTE — ROUTINE PROCESS
Bedside and Verbal shift change report given to RACHEAL Chopra R.N. (oncoming nurse) by MANUELITO Berry R.N. (offgoing nurse). Report included the following information SBAR, Kardex, Intake/Output, MAR, Accordion, Recent Results and Med Rec Status.

## 2019-09-28 NOTE — PROGRESS NOTES
0723:Received verbal bedside report from off going nurse RACHEAL Chopra R.N. Patient care received. Patient alert and oriented x 4 with periodic confusion. Patient resting in bed. Patient stable. Call light with in reach bed in lowest position.

## 2019-09-28 NOTE — PROGRESS NOTES
[id number] Shift Summary: Pt rested well overnight with no complaints. No new clinical concerns noted.

## 2019-09-28 NOTE — ROUTINE PROCESS
Bedside and Verbal shift change report given to WENDI Inman (oncoming nurse) by MANUELITO Berry R.N. (offgoing nurse). Report included the following information SBAR, Kardex, Intake/Output, MAR, Accordion, Recent Results and Med Rec Status.

## 2019-09-28 NOTE — PROGRESS NOTES
Hospitalist Progress Note    Patient: Ravi Bliss MRN: [id number]  CSN: [id number]    YOB: 1955  Age: 59 y.o. Sex: male    DOA: 9/26/2019 LOS:  LOS: 2 days                Assessment and Plan:    Principal Problem:    Edema (9/26/2019)    Active Problems:    SOB (shortness of breath) (9/26/2019)        Edema: he is doing better with 5 kg of weight loss from diuresis    SOB: VQ scan is negative as is CT of chest and CXR. Normal Echocardiogram    Psych: continue home meds. Will restart    He needs to improve bowel regimen    Chief complaint:  Shortness of breath       Subjective:    Has increased abdominal pain and no bowel movement      Review of systems:    General: No fevers or chills. Cardiovascular: No chest pain or pressure. No palpitations. Pulmonary: No shortness of breath. Gastrointestinal: No nausea, vomiting. Objective:    Vital signs/Intake and Output:  Visit Vitals  /69 (BP 1 Location: Left arm, BP Patient Position: At rest)   Pulse 60   Temp 97.8 °F (36.6 °C)   Resp 17   Ht 5' 5\" (1.651 m)   Wt 80.9 kg (178 lb 5.6 oz)   SpO2 99%   BMI 29.68 kg/m²     Current Shift:  09/28 0701 - 09/28 1900  In: 120 [P.O.:120]  Out: -   Last three shifts:  09/26 1901 - 09/28 0700  In: 240 [P.O.:240]  Out: 1500 [Urine:1500]    Physical Exam:  General: NAD, AAOx3. Non-toxic. HEENT: NC/AT. PERRLA, EOMI.  MMM. Lungs: Nml inspection. CTA B/L. No wheezing, rales or rhonchi. Heart:  S1S2 RRR,  PMI mid 5th IC space. No M/RG. Abdomen: Soft, NT/ND.  BS+. No peritoneal signs. Extremities: No C/C/E. Psych:   Nml affect. Neurologic:  2-12 intact. Strength 5/5 throughout.   Sensation symmetrical.          Labs: Results:       Chemistry Recent Labs     09/28/19  0316 09/27/19  0610 09/26/19  1730   GLU 98 80 87    142 140   K 3.7 3.4* 3.6    107 107   CO2 28 28 26   BUN 33* 26* 27*   CREA 1.41* 1.37* 1.64*   CA 9.1 9.1 9.2   AGAP 5 7 7   BUCR 23* 19 16   AP 75 80 76   TP 6.9 7.4 7.7   ALB 3.5 3.9 4.0   GLOB 3.4 3.5 3.7   AGRAT 1.0 1.1 1.1      CBC w/Diff Recent Labs     09/28/19  0316 09/27/19  0610 09/26/19  1730   WBC 5.6 4.9 5.7   RBC 4.84 5.05 4.65*   HGB 13.8 14.7 13.3   HCT 43.2 45.0 41.4    175 184   GRANS  --   --  62   LYMPH  --   --  29   EOS  --   --  1      Cardiac Enzymes Recent Labs     09/27/19  0610 09/26/19  1730   * 408*   CKND1 0.3 0.5      Coagulation No results for input(s): PTP, INR, APTT, INREXT in the last 72 hours. Lipid Panel No results found for: CHOL, CHOLPOCT, CHOLX, CHLST, CHOLV, 134755, HDL, HDLP, LDL, LDLC, DLDLP, 598706, VLDLC, VLDL, TGLX, TRIGL, TRIGP, TGLPOCT, CHHD, CHHDX   BNP No results for input(s): BNPP in the last 72 hours.    Liver Enzymes Recent Labs     09/28/19  0316   TP 6.9   ALB 3.5   AP 75   SGOT 19      Thyroid Studies No results found for: T4, T3U, TSH, TSHEXT     Procedures/imaging: see electronic medical records for all procedures/Xrays and details which were not copied into this note but were reviewed prior to creation of Plan

## 2019-09-29 LAB
ALBUMIN SERPL-MCNC: 3.5 G/DL (ref 3.4–5)
ALBUMIN/GLOB SERPL: 1 {RATIO} (ref 0.8–1.7)
ALP SERPL-CCNC: 74 U/L (ref 45–117)
ALT SERPL-CCNC: 19 U/L (ref 16–61)
ANION GAP SERPL CALC-SCNC: 5 MMOL/L (ref 3–18)
AST SERPL-CCNC: 19 U/L (ref 10–38)
BILIRUB SERPL-MCNC: 0.3 MG/DL (ref 0.2–1)
BUN SERPL-MCNC: 29 MG/DL (ref 7–18)
BUN/CREAT SERPL: 20 (ref 12–20)
CALCIUM SERPL-MCNC: 9.1 MG/DL (ref 8.5–10.1)
CHLORIDE SERPL-SCNC: 110 MMOL/L (ref 100–111)
CO2 SERPL-SCNC: 25 MMOL/L (ref 21–32)
CREAT SERPL-MCNC: 1.48 MG/DL (ref 0.6–1.3)
ERYTHROCYTE [DISTWIDTH] IN BLOOD BY AUTOMATED COUNT: 12.2 % (ref 11.6–14.5)
GLOBULIN SER CALC-MCNC: 3.4 G/DL (ref 2–4)
GLUCOSE SERPL-MCNC: 104 MG/DL (ref 74–99)
HCT VFR BLD AUTO: 42.3 % (ref 36–48)
HGB BLD-MCNC: 13.6 G/DL (ref 13–16)
MCH RBC QN AUTO: 28.7 PG (ref 24–34)
MCHC RBC AUTO-ENTMCNC: 32.2 G/DL (ref 31–37)
MCV RBC AUTO: 89.2 FL (ref 74–97)
PLATELET # BLD AUTO: 167 K/UL (ref 135–420)
PMV BLD AUTO: 10 FL (ref 9.2–11.8)
POTASSIUM SERPL-SCNC: 3.8 MMOL/L (ref 3.5–5.5)
PROT SERPL-MCNC: 6.9 G/DL (ref 6.4–8.2)
RBC # BLD AUTO: 4.74 M/UL (ref 4.7–5.5)
SODIUM SERPL-SCNC: 140 MMOL/L (ref 136–145)
WBC # BLD AUTO: 5.6 K/UL (ref 4.6–13.2)

## 2019-09-29 PROCEDURE — 74011250637 HC RX REV CODE- 250/637: Performed by: INTERNAL MEDICINE

## 2019-09-29 PROCEDURE — 36415 COLL VENOUS BLD VENIPUNCTURE: CPT

## 2019-09-29 PROCEDURE — 65660000000 HC RM CCU STEPDOWN

## 2019-09-29 PROCEDURE — 74011250636 HC RX REV CODE- 250/636: Performed by: FAMILY MEDICINE

## 2019-09-29 PROCEDURE — 85027 COMPLETE CBC AUTOMATED: CPT

## 2019-09-29 PROCEDURE — 80053 COMPREHEN METABOLIC PANEL: CPT

## 2019-09-29 RX ORDER — FUROSEMIDE 10 MG/ML
40 INJECTION INTRAMUSCULAR; INTRAVENOUS DAILY
Status: DISCONTINUED | OUTPATIENT
Start: 2019-09-30 | End: 2019-09-30 | Stop reason: HOSPADM

## 2019-09-29 RX ADMIN — ATORVASTATIN CALCIUM 10 MG: 10 TABLET, FILM COATED ORAL at 08:46

## 2019-09-29 RX ADMIN — LORAZEPAM 0.5 MG: 0.5 TABLET ORAL at 08:46

## 2019-09-29 RX ADMIN — PSYLLIUM HUSK 1 PACKET: 3.4 POWDER ORAL at 08:46

## 2019-09-29 RX ADMIN — HEPARIN SODIUM 5000 UNITS: 5000 INJECTION, SOLUTION INTRAVENOUS; SUBCUTANEOUS at 06:02

## 2019-09-29 RX ADMIN — FUROSEMIDE 20 MG: 10 INJECTION, SOLUTION INTRAMUSCULAR; INTRAVENOUS at 08:45

## 2019-09-29 RX ADMIN — OLANZAPINE 5 MG: 10 TABLET, FILM COATED ORAL at 21:00

## 2019-09-29 RX ADMIN — DIVALPROEX SODIUM 500 MG: 250 TABLET, DELAYED RELEASE ORAL at 20:55

## 2019-09-29 RX ADMIN — HEPARIN SODIUM 5000 UNITS: 5000 INJECTION, SOLUTION INTRAVENOUS; SUBCUTANEOUS at 13:19

## 2019-09-29 RX ADMIN — PANTOPRAZOLE SODIUM 40 MG: 40 TABLET, DELAYED RELEASE ORAL at 08:46

## 2019-09-29 RX ADMIN — BUSPIRONE HYDROCHLORIDE 5 MG: 5 TABLET ORAL at 17:05

## 2019-09-29 RX ADMIN — HEPARIN SODIUM 5000 UNITS: 5000 INJECTION, SOLUTION INTRAVENOUS; SUBCUTANEOUS at 21:00

## 2019-09-29 RX ADMIN — BUSPIRONE HYDROCHLORIDE 5 MG: 5 TABLET ORAL at 13:19

## 2019-09-29 RX ADMIN — LORAZEPAM 0.5 MG: 0.5 TABLET ORAL at 17:05

## 2019-09-29 RX ADMIN — DIVALPROEX SODIUM 500 MG: 250 TABLET, DELAYED RELEASE ORAL at 08:46

## 2019-09-29 RX ADMIN — TAMSULOSIN HYDROCHLORIDE 0.4 MG: 0.4 CAPSULE ORAL at 08:46

## 2019-09-29 RX ADMIN — BUSPIRONE HYDROCHLORIDE 5 MG: 5 TABLET ORAL at 08:46

## 2019-09-29 RX ADMIN — LORAZEPAM 0.5 MG: 0.5 TABLET ORAL at 21:00

## 2019-09-29 RX ADMIN — TOPIRAMATE 100 MG: 100 TABLET, FILM COATED ORAL at 08:46

## 2019-09-29 RX ADMIN — POLYETHYLENE GLYCOL 3350 17 G: 17 POWDER, FOR SOLUTION ORAL at 08:46

## 2019-09-29 RX ADMIN — TOPIRAMATE 100 MG: 100 TABLET, FILM COATED ORAL at 21:00

## 2019-09-29 RX ADMIN — PSYLLIUM HUSK 1 PACKET: 3.4 POWDER ORAL at 20:56

## 2019-09-29 RX ADMIN — FLUOXETINE 40 MG: 20 CAPSULE ORAL at 08:46

## 2019-09-29 NOTE — PROGRESS NOTES
Problem: Falls - Risk of  Goal: *Absence of Falls  Description  Document Aubrey Massey Fall Risk and appropriate interventions in the flowsheet.   Outcome: Progressing Towards Goal  Note:   Fall Risk Interventions:  Mobility Interventions: Bed/chair exit alarm, Patient to call before getting OOB, Utilize walker, cane, or other assistive device         Medication Interventions: Bed/chair exit alarm, Evaluate medications/consider consulting pharmacy, Patient to call before getting OOB, Teach patient to arise slowly    Elimination Interventions: Bed/chair exit alarm, Call light in reach, Patient to call for help with toileting needs, Stay With Me (per policy), Toilet paper/wipes in reach, Toileting schedule/hourly rounds, Urinal in reach              Problem: General Medical Care Plan  Goal: *Vital signs within specified parameters  Outcome: Progressing Towards Goal  Goal: *Labs within defined limits  Outcome: Progressing Towards Goal  Goal: *Absence of infection signs and symptoms  Outcome: Progressing Towards Goal  Goal: *Optimal pain control at patient's stated goal  Outcome: Progressing Towards Goal  Goal: *Skin integrity maintained  Outcome: Progressing Towards Goal  Goal: *Fluid volume balance  Outcome: Progressing Towards Goal  Goal: *Optimize nutritional status  Outcome: Progressing Towards Goal  Goal: *Anxiety reduced or absent  Outcome: Progressing Towards Goal  Goal: *Progressive mobility and function (eg: ADL's)  Outcome: Progressing Towards Goal

## 2019-09-29 NOTE — PROGRESS NOTES
Problem: Pain  Goal: *Control of Pain  Outcome: Progressing Towards Goal  Goal: *PALLIATIVE CARE:  Alleviation of Pain  Outcome: Progressing Towards Goal     Problem: Patient Education: Go to Patient Education Activity  Goal: Patient/Family Education  Outcome: Progressing Towards Goal     Problem: Falls - Risk of  Goal: *Absence of Falls  Description  Document Yany Bailey Fall Risk and appropriate interventions in the flowsheet.   Outcome: Progressing Towards Goal  Note:   Fall Risk Interventions:  Mobility Interventions: Communicate number of staff needed for ambulation/transfer, Assess mobility with egress test, PT Consult for mobility concerns    Mentation Interventions: Door open when patient unattended, Reorient patient    Medication Interventions: Patient to call before getting OOB, Teach patient to arise slowly    Elimination Interventions: Call light in reach, Patient to call for help with toileting needs, Stay With Me (per policy)              Problem: Patient Education: Go to Patient Education Activity  Goal: Patient/Family Education  Outcome: Progressing Towards Goal

## 2019-09-29 NOTE — PROGRESS NOTES
0711:Received verbal bedside report from off going nurse MADI Campos R.N. Patient care received. Patient alert and oriented x 4. Patient resting in bed denies pain. Patient stable. Call light with in reach bed in lowest position.

## 2019-09-29 NOTE — PROGRESS NOTES
Pt sleeping, easily awakened, garbled speech. Pt stated his feet hurt. Floated heels on 2 pillows - pt reported pain relieved afterwards. Urinal by bedside    0655 Pt sleeping, no distress, awakened easily. No complaints.  Shift uneventful

## 2019-09-29 NOTE — PROGRESS NOTES
Hospitalist Progress Note    Patient: Stevo Shultz MRN: [id number]  CSN: [id number]    YOB: 1955  Age: 59 y.o. Sex: male    DOA: 9/26/2019 LOS:  LOS: 3 days                Assessment and Plan:    Principal Problem:    Edema (9/26/2019)    Active Problems:    SOB (shortness of breath) (9/26/2019)        Edema: will increase his diuresis . As he has actually had 5 kg of weight gain per chart ot weight loss     SOB: VQ scan is negative as is CT of chest and CXR. Normal Echocardiogram     Psych: continue home meds. restarted yesterday      His stomach and  Appetite are better after having a bowel movement but still a little distended    Possible dc tomorrow , his guardian is currently admitted with meningitis so we are careful about sending him home       Chief complaint:  Shortness of breath          Subjective:    He is sleeping comfortably and actually asked for food after having first BM today        Review of systems:    General: No fevers or chills. Cardiovascular: No chest pain or pressure. No palpitations. Pulmonary: No shortness of breath. Gastrointestinal: No nausea, vomiting. Objective:    Vital signs/Intake and Output:  Visit Vitals  /75 (BP 1 Location: Right arm, BP Patient Position: At rest)   Pulse (!) 51   Temp 97.5 °F (36.4 °C)   Resp 16   Ht 5' 5\" (1.651 m)   Wt 85.3 kg (188 lb)   SpO2 99%   BMI 31.28 kg/m²     Current Shift:  09/29 0701 - 09/29 1900  In: 240 [P.O.:240]  Out: 300 [Urine:300]  Last three shifts:  09/27 1901 - 09/29 0700  In: 360 [P.O.:360]  Out: 1175 [Urine:1175]    Physical Exam:  General: NAD, AAOx3. Non-toxic. HEENT: NC/AT. PERRLA, EOMI.  MMM. Lungs: Nml inspection. CTA B/L. No wheezing, rales or rhonchi. Heart:  S1S2 RRR,  PMI mid 5th IC space. No M/RG. Abdomen: Soft, NT/ND.  BS+. No peritoneal signs. Extremities: No C/C/E. Psych:   Nml affect. Neurologic:  2-12 intact. Strength 5/5 throughout.   Sensation symmetrical.          Labs: Results:       Chemistry Recent Labs     09/29/19 0226 09/28/19 0316 09/27/19  0610   * 98 80    142 142   K 3.8 3.7 3.4*    109 107   CO2 25 28 28   BUN 29* 33* 26*   CREA 1.48* 1.41* 1.37*   CA 9.1 9.1 9.1   AGAP 5 5 7   BUCR 20 23* 19   AP 74 75 80   TP 6.9 6.9 7.4   ALB 3.5 3.5 3.9   GLOB 3.4 3.4 3.5   AGRAT 1.0 1.0 1.1      CBC w/Diff Recent Labs     09/29/19 0226 09/28/19 0316 09/27/19  0610 09/26/19  1730   WBC 5.6 5.6 4.9 5.7   RBC 4.74 4.84 5.05 4.65*   HGB 13.6 13.8 14.7 13.3   HCT 42.3 43.2 45.0 41.4    181 175 184   GRANS  --   --   --  62   LYMPH  --   --   --  29   EOS  --   --   --  1      Cardiac Enzymes Recent Labs     09/27/19  0610 09/26/19  1730   * 408*   CKND1 0.3 0.5      Coagulation No results for input(s): PTP, INR, APTT, INREXT in the last 72 hours. Lipid Panel No results found for: CHOL, CHOLPOCT, CHOLX, CHLST, CHOLV, 498778, HDL, HDLP, LDL, LDLC, DLDLP, 240522, VLDLC, VLDL, TGLX, TRIGL, TRIGP, TGLPOCT, CHHD, CHHDX   BNP No results for input(s): BNPP in the last 72 hours.    Liver Enzymes Recent Labs     09/29/19 0226   TP 6.9   ALB 3.5   AP 74   SGOT 19      Thyroid Studies No results found for: T4, T3U, TSH, TSHEXT     Procedures/imaging: see electronic medical records for all procedures/Xrays and details which were not copied into this note but were reviewed prior to creation of Plan

## 2019-09-29 NOTE — PROGRESS NOTES
[facility] care from Roberta Tracy RN.    1241 Bedside and Verbal shift change report given to Sondra Yanez RN (oncoming nurse) by Marcie Gonzales RN   (offgoing nurse). Report included the following information SBAR, Kardex, ED Summary, Procedure Summary, Intake/Output, MAR, Recent Results and Med Rec Status.

## 2019-09-29 NOTE — ROUTINE PROCESS
Verbal shift change report given to Rj Menendez RN (oncoming nurse) by Cesar Arroyo RN 
 (offgoing nurse). Report included the following information SBAR, Kardex, Intake/Output, MAR, Recent Results and Cardiac Rhythm [place].

## 2019-09-30 VITALS
TEMPERATURE: 97.3 F | BODY MASS INDEX: 30.27 KG/M2 | SYSTOLIC BLOOD PRESSURE: 123 MMHG | DIASTOLIC BLOOD PRESSURE: 71 MMHG | HEIGHT: 65 IN | OXYGEN SATURATION: 100 % | RESPIRATION RATE: 14 BRPM | HEART RATE: 62 BPM | WEIGHT: 181.66 LBS

## 2019-09-30 PROBLEM — F99 PSYCHIATRIC DISORDER: Status: ACTIVE | Noted: 2019-09-30

## 2019-09-30 PROCEDURE — 97161 PT EVAL LOW COMPLEX 20 MIN: CPT

## 2019-09-30 PROCEDURE — 74011250636 HC RX REV CODE- 250/636: Performed by: INTERNAL MEDICINE

## 2019-09-30 PROCEDURE — 74011250637 HC RX REV CODE- 250/637: Performed by: INTERNAL MEDICINE

## 2019-09-30 PROCEDURE — 74011250636 HC RX REV CODE- 250/636: Performed by: FAMILY MEDICINE

## 2019-09-30 RX ORDER — FUROSEMIDE 20 MG/1
10 TABLET ORAL DAILY
Qty: 15 TAB | Refills: 0 | Status: SHIPPED | OUTPATIENT
Start: 2019-09-30

## 2019-09-30 RX ADMIN — PANTOPRAZOLE SODIUM 40 MG: 40 TABLET, DELAYED RELEASE ORAL at 10:39

## 2019-09-30 RX ADMIN — BUSPIRONE HYDROCHLORIDE 5 MG: 5 TABLET ORAL at 10:39

## 2019-09-30 RX ADMIN — ATORVASTATIN CALCIUM 10 MG: 10 TABLET, FILM COATED ORAL at 10:39

## 2019-09-30 RX ADMIN — LORAZEPAM 0.5 MG: 0.5 TABLET ORAL at 10:39

## 2019-09-30 RX ADMIN — FLUOXETINE 40 MG: 20 CAPSULE ORAL at 10:39

## 2019-09-30 RX ADMIN — DIVALPROEX SODIUM 500 MG: 250 TABLET, DELAYED RELEASE ORAL at 10:39

## 2019-09-30 RX ADMIN — HEPARIN SODIUM 5000 UNITS: 5000 INJECTION, SOLUTION INTRAVENOUS; SUBCUTANEOUS at 06:52

## 2019-09-30 RX ADMIN — FUROSEMIDE 40 MG: 10 INJECTION, SOLUTION INTRAMUSCULAR; INTRAVENOUS at 10:39

## 2019-09-30 RX ADMIN — TAMSULOSIN HYDROCHLORIDE 0.4 MG: 0.4 CAPSULE ORAL at 10:39

## 2019-09-30 RX ADMIN — TOPIRAMATE 100 MG: 100 TABLET, FILM COATED ORAL at 10:39

## 2019-09-30 RX ADMIN — POLYETHYLENE GLYCOL 3350 17 G: 17 POWDER, FOR SOLUTION ORAL at 10:39

## 2019-09-30 NOTE — PROGRESS NOTES
6230: Bedside and Verbal shift change report given to Desmond Benjamin RN (oncoming nurse) by Mariola De La Cruz RN (offgoing nurse). Report included the following information Kardex, MAR, Cardiac Rhythm NSR and Alarm Parameters .      1037: case management in to discuss post discharge tx with pt  Desmond Benjamin RN    [phone]: PT at the bedside with pt evaluating activity and strength  Desmond Benjamin RN    [phone]: MD Mitzy Carlin writes order for discharge for pt   Desmond Benjamin RN    1400: discharge instructions reviewed with pt and son, no questions for this nurse,  transports pt down to discharge area for , no c/o health abnormalities at time of discharge  Desmond Benjamin RN

## 2019-09-30 NOTE — DISCHARGE INSTRUCTIONS
Patient Education        Electrolyte Imbalance: Care Instructions  Your Care Instructions  Electrolytes are minerals in your blood. They include sodium, potassium, calcium, and magnesium. When they are not at the right levels, you can feel very ill. You may not know what is causing it, but you know something is wrong. You may feel weak or numb, have muscle spasms, or twitch. Your heart may beat fast. Symptoms are different with each mineral. Too much is as bad as too little. Minerals help keep your body working as it should. Vomiting, diarrhea, and fever can cause you to lose minerals. A problem with your kidneys can tip a mineral out of balance. So can taking certain medicines. Your doctor may do more tests. He or she may change your medicine and diet. If you are low in one or more minerals, they may be given through a tube into your vein (IV). Your doctor may have you take or drink special fluids or pills. If your kidneys are failing, your blood may be filtered. This is called dialysis. It can put the minerals back in balance. Follow-up care is a key part of your treatment and safety. Be sure to make and go to all appointments, and call your doctor if you are having problems. It's also a good idea to know your test results and keep a list of the medicines you take. How can you care for yourself at home? · Take your medicines exactly as prescribed. Call your doctor if you have any problems with your medicines. You will get more details on the specific medicines your doctor prescribes. · Do not take any medicine without talking to your doctor first. This includes prescription, over-the-counter, and herbal medicines. · If you have kidney, heart, or liver disease and have to limit fluids, talk with your doctor before you increase the amount of fluids you drink. · Your doctor or dietitian may give you a diet plan to help balance your minerals. Follow the diet carefully. When should you call for help?   Call [phone] anytime you think you may need emergency care. For example, call if:    · You passed out (lost consciousness).     · Your heartbeat seems to be irregular. It might be speeding up and then slowing down or skipping beats.    Call your doctor now or seek immediate medical care if:    · You have muscle aches.     · You feel very weak.     · You are confused or cannot think clearly.    Watch closely for changes in your health, and be sure to contact your doctor if:    · You do not get better as expected. Where can you learn more? Go to http://romero-frank.info/. Enter Z514 in the search box to learn more about \"Electrolyte Imbalance: Care Instructions. \"  Current as of: November 7, 2018  Content Version: 12.2  © 1797-6045 [employer]. Care instructions adapted under license by [employer] (which disclaims liability or warranty for this information). If you have questions about a medical condition or this instruction, always ask your healthcare professional. [place] [address] any warranty or liability for your use of this information. DISCHARGE SUMMARY from Nurse    PATIENT INSTRUCTIONS:    After for 24 hours or while taking prescription Narcotics:  · Limit your activities  · Do not drive and operate hazardous machinery  · Do not make important personal or business decisions  · Do  not drink alcoholic beverages  · If you have not urinated within 8 hours after discharge, please contact your surgeon on call.     Report the following to your surgeon:  · Excessive pain, swelling, redness or odor of or around the surgical area  · Temperature over 100.5  · Nausea and vomiting lasting longer than 8 hours or if unable to take medications  · Any signs of decreased circulation or nerve impairment to extremity: change in color, persistent  numbness, tingling, coldness or increase pain  · Any questions    What to do at Home:  *  Please give a list of your current medications to your Primary Care Provider. *  Please update this list whenever your medications are discontinued, doses are      changed, or new medications (including over-the-counter products) are added. *  Please carry medication information at all times in case of emergency situations. These are general instructions for a healthy lifestyle:    No smoking/ No tobacco products/ Avoid exposure to second hand smoke  Surgeon General's Warning:  Quitting smoking now greatly reduces serious risk to your health. Obesity, smoking, and sedentary lifestyle greatly increases your risk for illness    A healthy diet, regular physical exercise & weight monitoring are important for maintaining a healthy lifestyle    You may be retaining fluid if you have a history of heart failure or if you experience any of the following symptoms:  Weight gain of 3 pounds or more overnight or 5 pounds in a week, increased swelling in our hands or feet or shortness of breath while lying flat in bed. Please call your doctor as soon as you notice any of these symptoms; do not wait until your next office visit. The discharge information has been reviewed with the patient and caregiver  Patient Education        Leg and Ankle Edema: Care Instructions  Your Care Instructions  Swelling in the legs, ankles, and feet is called edema. It is common after you sit or stand for a while. Long plane flights or car rides often cause swelling in the legs and feet. You may also have swelling if you have to stand for long periods of time at your job. Problems with the veins in the legs (varicose veins) and changes in hormones can also cause swelling. Sometimes the swelling in the ankles and feet is caused by a more serious problem, such as heart failure, infection, blood clots, or liver or kidney disease. Follow-up care is a key part of your treatment and safety.  Be sure to make and go to all appointments, and call your doctor if you are having problems. It's also a good idea to know your test results and keep a list of the medicines you take. How can you care for yourself at home? · If your doctor gave you medicine, take it as prescribed. Call your doctor if you think you are having a problem with your medicine. · Whenever you are resting, raise your legs up. Try to keep the swollen area higher than the level of your heart. · Take breaks from standing or sitting in one position. ? Walk around to increase the blood flow in your lower legs. ? Move your feet and ankles often while you stand, or tighten and relax your leg muscles. · Wear support stockings. Put them on in the morning, before swelling gets worse. · Eat a balanced diet. Lose weight if you need to. · Limit the amount of salt (sodium) in your diet. Salt holds fluid in the body and may increase swelling. When should you call for help? Call 911 anytime you think you may need emergency care. For example, call if:    · You have symptoms of a blood clot in your lung (called a pulmonary embolism). These may include:  ? Sudden chest pain. ? Trouble breathing. ? Coughing up blood.    Call your doctor now or seek immediate medical care if:    · You have signs of a blood clot, such as:  ? Pain in your calf, back of the knee, thigh, or groin. ? Redness and swelling in your leg or groin.     · You have symptoms of infection, such as:  ? Increased pain, swelling, warmth, or redness. ? Red streaks or pus. ? A fever.    Watch closely for changes in your health, and be sure to contact your doctor if:    · Your swelling is getting worse.     · You have new or worsening pain in your legs.     · You do not get better as expected. Where can you learn more? Go to http://romero-frank.info/. Enter V257 in the search box to learn more about \"Leg and Ankle Edema: Care Instructions. \"  Current as of: June 26, 2019  Content Version: 12.2  © 5181-5070 [employer]. Care instructions adapted under license by [employer] (which disclaims liability or warranty for this information). If you have questions about a medical condition or this instruction, always ask your healthcare professional. [facility] [address] any warranty or liability for your use of this information. Patient Education        Shortness of Breath: Care Instructions  Your Care Instructions  Shortness of breath has many causes. Sometimes conditions such as anxiety can lead to shortness of breath. Some people get mild shortness of breath when they exercise. Trouble breathing also can be a symptom of a serious problem, such as asthma, lung disease, emphysema, heart problems, and pneumonia. If your shortness of breath continues, you may need tests and treatment. Watch for any changes in your breathing and other symptoms. Follow-up care is a key part of your treatment and safety. Be sure to make and go to all appointments, and call your doctor if you are having problems. It's also a good idea to know your test results and keep a list of the medicines you take. How can you care for yourself at home? · Do not smoke or allow others to smoke around you. If you need help quitting, talk to your doctor about stop-smoking programs and medicines. These can increase your chances of quitting for good. · Get plenty of rest and sleep. · Take your medicines exactly as prescribed. Call your doctor if you think you are having a problem with your medicine. · Find healthy ways to deal with stress. ? Exercise daily. ? Get plenty of sleep. ? Eat regularly and well. When should you call for help? Call 911 anytime you think you may need emergency care. For example, call if:    · You have severe shortness of breath.     · You have symptoms of a heart attack. These may include:  ? Chest pain or pressure, or a strange feeling in the chest.  ? Sweating. ? Shortness of breath.   ? Nausea or vomiting. ? Pain, pressure, or a strange feeling in the back, neck, jaw, or upper belly or in one or both shoulders or arms. ? Lightheadedness or sudden weakness. ? A fast or irregular heartbeat. After you call 911, the  may tell you to chew 1 adult-strength or 2 to 4 low-dose aspirin. Wait for an ambulance. Do not try to drive yourself.    Call your doctor now or seek immediate medical care if:    · Your shortness of breath gets worse or you start to wheeze. Wheezing is a high-pitched sound when you breathe.     · You wake up at night out of breath or have to prop your head up on several pillows to breathe.     · You are short of breath after only light activity or while at rest.    Watch closely for changes in your health, and be sure to contact your doctor if:    · You do not get better over the next 1 to 2 days. Where can you learn more? Go to http://romero-frank.info/. Enter S780 in the search box to learn more about \"Shortness of Breath: Care Instructions. \"  Current as of: June 9, 2019  Content Version: 12.2  © 2409-8247 [employer]. Care instructions adapted under license by [employer] (which disclaims liability or warranty for this information). If you have questions about a medical condition or this instruction, always ask your healthcare professional. [address] any warranty or liability for your use of this information. .  The patient and caregiver verbalized understanding. Discharge medications reviewed with the patient and caregiver and appropriate educational materials and side effects teaching were provided.   ___________________________________________________________________________________________________________________________________

## 2019-09-30 NOTE — PROGRESS NOTES
Problem: Pain  Goal: *Control of Pain  Outcome: Progressing Towards Goal  Goal: *PALLIATIVE CARE:  Alleviation of Pain  Outcome: Progressing Towards Goal     Problem: Falls - Risk of  Goal: *Absence of Falls  Description  Document Chris Aleman Fall Risk and appropriate interventions in the flowsheet.   Outcome: Progressing Towards Goal  Note:   Fall Risk Interventions:  Mobility Interventions: Assess mobility with egress test, Communicate number of staff needed for ambulation/transfer, Patient to call before getting OOB, Utilize walker, cane, or other assistive device, PT Consult for mobility concerns    Mentation Interventions: Adequate sleep, hydration, pain control, Bed/chair exit alarm, Door open when patient unattended, Evaluate medications/consider consulting pharmacy, Familiar objects from home, Increase mobility, More frequent rounding, Reorient patient, Toileting rounds, Update white board    Medication Interventions: Bed/chair exit alarm, Evaluate medications/consider consulting pharmacy, Patient to call before getting OOB, Teach patient to arise slowly    Elimination Interventions: Bed/chair exit alarm, Call light in reach, Patient to call for help with toileting needs, Stay With Me (per policy), Toilet paper/wipes in reach, Toileting schedule/hourly rounds, Urinal in reach

## 2019-09-30 NOTE — PROGRESS NOTES
Occupational Therapy Evaluation Attempt/Discharge:    Chart reviewed. Attempted Occupational Therapy Eval/tx. Patient discharged home with home health.       Karine Castillo, OTR/L
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
## 2019-09-30 NOTE — DISCHARGE SUMMARY
Discharge Summary    Patient: Rachelle Agarwal MRN: [id number]  CSN: [id number]    YOB: 1955  Age: 59 y.o. Sex: male    DOA: 9/26/2019 LOS:  LOS: 4 days   Discharge Date:      Primary Care Provider:  Jag Deshpande MD    Admission Diagnoses: SOB (shortness of breath) [R06.02]  Edema [R60.9]    Discharge Diagnoses:    Hospital Problems  Date Reviewed: 9/27/2019          Codes Class Noted POA    Psychiatric disorder ICD-10-CM: F99  ICD-9-CM: 300.9  9/30/2019 Unknown    Overview Signed 9/30/2019 12:33 PM by Joel Conte MD     ID             * (Principal) Edema ICD-10-CM: R60.9  ICD-9-CM: 782.3  9/26/2019 Yes        SOB (shortness of breath) ICD-10-CM: R06.02  ICD-9-CM: 786.05  9/26/2019 Yes              Discharge Condition: stable     Discharge Medications:     Current Discharge Medication List      CONTINUE these medications which have CHANGED    Details   furosemide (LASIX) 20 mg tablet Take 0.5 Tabs by mouth daily. Qty: 15 Tab, Refills: 0         CONTINUE these medications which have NOT CHANGED    Details   divalproex DR (DEPAKOTE) 500 mg tablet Take 500 mg by mouth every twelve (12) hours. 8am and 8pm      pantoprazole (PROTONIX) 40 mg tablet Take 40 mg by mouth daily. topiramate (TOPAMAX) 100 mg tablet Take 100 mg by mouth two (2) times a day. LORazepam (ATIVAN) 0.5 mg tablet Take 0.5 mg by mouth three (3) times daily. busPIRone (BUSPAR) 5 mg tablet Take 5 mg by mouth three (3) times daily (with meals). FLUoxetine (PROZAC) 40 mg capsule Take 40 mg by mouth daily. tamsulosin (FLOMAX) 0.4 mg capsule Take 0.4 mg by mouth daily. OLANZapine (ZYPREXA) 5 mg tablet Take 5 mg by mouth nightly. atorvastatin (LIPITOR) 10 mg tablet Take 10 mg by mouth daily. meclizine (ANTIVERT) 25 mg tablet Take 25 mg by mouth three (3) times daily as needed. psyllium (FIBER LAXATIVE, PSYLLIUM HUSK,) 0.52 gram capsule Take 2 Caps by mouth daily as needed.          STOP taking these medications       ibuprofen (MOTRIN) 600 mg tablet Comments:   Reason for Stopping:         potassium chloride SR (KLOR-CON 10) 10 mEq tablet Comments:   Reason for Stopping:         amLODIPine (NORVASC) 10 mg tablet Comments:   Reason for Stopping:               Procedures :none     Consults: None      PHYSICAL EXAM   Visit Vitals  /71   Pulse 62   Temp 97.3 °F (36.3 °C)   Resp 14   Ht 5' 5\" (1.651 m)   Wt 82.4 kg (181 lb 10.5 oz)   SpO2 100%   BMI 30.23 kg/m²     General: Awake, cooperative, no acute distress    HEENT: NC, Atraumatic. PERRLA, EOMI. Anicteric sclerae. Lungs:  CTA Bilaterally. No Wheezing/Rhonchi/Rales. Heart:  Regular  rhythm,  No murmur, No Rubs, No Gallops  Abdomen: Soft, Non distended, Non tender. +Bowel sounds,   Extremities: No c/c/e  Psych:   Not anxious or agitated. Neurologic:  No acute neurological deficits. Admission HPI :   Mike Crawford is a 59 y.o. male who presented to the ER c/o b/l knee pain. Could not provide any additional history and was asleep at the time of my evaluation. Denies any pain or symptoms other than the knee pain. Per ER note, he has had chronic b/l LE edema and SOB that was worked up by PCP without a source. RN says that he was more awake and cooperative earlier but didn't provide much history and caregiver not at [address] [place] [address] Course :   Since he was admitted, iv lasix was given for his edema. Echo done with normal ef,     Discharge planning discussed with patient and family, agree with the plan and no questions and concerns at this point. cta chest negative for PE. With the treatment, his edema resolved and sob resolved. We continued his medication for psychiatric disorder and he was doing well during his stay. He remained hemodynamic stable during his stay.         Activity: Activity as tolerated    Diet: Regular Diet    Follow-up: PCP    Disposition: home     Minutes spent on discharge: 45 min       Labs: Results:       Chemistry Recent Labs     09/29/19 0226 09/28/19 0316   * 98    142   K 3.8 3.7    109   CO2 25 28   BUN 29* 33*   CREA 1.48* 1.41*   CA 9.1 9.1   AGAP 5 5   BUCR 20 23*   AP 74 75   TP 6.9 6.9   ALB 3.5 3.5   GLOB 3.4 3.4   AGRAT 1.0 1.0      CBC w/Diff Recent Labs     09/29/19 0226 09/28/19 0316   WBC 5.6 5.6   RBC 4.74 4.84   HGB 13.6 13.8   HCT 42.3 43.2    181      Cardiac Enzymes No results for input(s): CPK, CKND1, JORDON in the last 72 hours. No lab exists for component: CKRMB, TROIP   Coagulation No results for input(s): PTP, INR, APTT, INREXT, INREXT in the last 72 hours. Lipid Panel No results found for: CHOL, CHOLPOCT, CHOLX, CHLST, CHOLV, 276472, HDL, HDLP, LDL, LDLC, DLDLP, 702994, VLDLC, VLDL, TGLX, TRIGL, TRIGP, TGLPOCT, CHHD, CHHDX   BNP No results for input(s): BNPP in the last 72 hours. Liver Enzymes Recent Labs     09/29/19 0226   TP 6.9   ALB 3.5   AP 74   SGOT 19      Thyroid Studies No results found for: T4, T3U, TSH, TSHEXT, TSHEXT         Significant Diagnostic Studies: Xr Chest Pa Lat    Result Date: 9/27/2019  Chest, frontal and lateral: INDICATION: Shortness of breath increasing over several days. Top normal heart size. No acute congestive heart failure. No focal infiltrate. Mediastinum and bony thorax unremarkable. IMPRESSION: No convincing active cardiopulmonary disease. Ct Chest Wo Cont    Result Date: 9/26/2019  EXAM: CT Chest INDICATION: Shortness of breath. COMPARISON: Two-view chest x-ray from same day, CT abdomen and pelvis and nuclear medicine perfusion scan from same day. TECHNIQUE: Axial CT imaging from the thoracic inlet through the diaphragm without intravenous contrast. Multiplanar reformats were generated. One or more dose reduction techniques were used on this CT: automated exposure control, adjustment of the mAs and/or kVp according to patient size, and iterative reconstruction techniques.   The specific techniques used on this CT exam have been documented in the patient's electronic medical record. Digital Imaging and Communications in Medicine (DICOM) format image data are available to nonaffiliated external healthcare facilities or entities on a secure, media free, reciprocally searchable basis with patient authorization for at least a 12-month period after this study. _______________ FINDINGS: LUNGS: No suspicious nodule or mass. No dilated or dependent changes without consolidation. Minimal medial segment right middle lobe and inferior lingular segment left upper lobe passive atelectasis from prominent epicardial fat. PLEURA: No pneumothorax or effusion. AIRWAY: Patent . MEDIASTINUM: Mild enlarged appearance of the heart. Small hiatus hernia with air in small volume of fluid in the mid to upper thoracic esophagus, in keeping with reflux. No pericardial effusion. Normal caliber thoracic aorta with trivial atherosclerotic changes of the transverse arch. LYMPH NODES: No enlarged lymph nodes. UPPER ABDOMEN: Unremarkable. OTHER: No acute or aggressive osseous abnormalities identified. _______________     IMPRESSION: 1. Mild bilateral dependent changes with no focal parenchymal consolidation, evidence of edema or effusion. 2. Cardiomegaly. 3. Small hiatus hernia with findings of gastroesophageal reflux. Ct Abd Pelv Wo Cont    Result Date: 9/26/2019  EXAM: CT of the abdomen and pelvis INDICATION: Abdominal distention and left lower extremity swelling COMPARISON: 12/26/2018 TECHNIQUE: Axial CT imaging of the abdomen and pelvis was performed without intravenous contrast. Multiplanar reformats were generated. One or more dose reduction techniques were used on this CT: automated exposure control, adjustment of the mAs and/or kVp according to patient size, and iterative reconstruction techniques. The specific techniques used on this CT exam have been documented in the patient's electronic medical record. Digital Imaging and Communications in Medicine (DICOM) format image data are available to nonaffiliated external healthcare facilities or entities on a secure, media free, reciprocally searchable basis with patient authorization for at least a 12-month period after this study. _______________ FINDINGS: LOWER CHEST: Small hiatus hernia. Cardiomegaly. Minimal dependent changes without effusion LIVER, BILIARY:   > Liver: No acute or suspicious abnormality on noncontrast imaging .   > Biliary: No biliary dilation. Gallbladder is unremarkable. PANCREAS: No acute process. SPLEEN: Normal. ADRENALS: Normal. KIDNEYS, URETERS, BLADDER: No hydronephrosis, perinephric fluid or induration. Punctate nonobstructive left lower pole calculus. No hydroureter or ureteral calculi. Unremarkable suboptimal distended bladder GASTROINTESTINAL TRACT: No bowel dilation or wall thickening. Normal appendix. LYMPH NODES: No enlarged lymph nodes. PELVIC ORGANS: No free fluid VASCULATURE: Unremarkable. OSSEOUS: No acute or aggressive osseous abnormalities identified. OTHER: None. _______________     IMPRESSION: 1. No CT evidence of an acute intra-abdominal or intrapelvic obstructive or inflammatory process. 2. Punctate nonobstructive left lower pole renal calculus. Nm Lung Perfusion W Vent    Result Date: 9/26/2019  EXAM: Nuclear medicine Perfusion scan  INDICATION: Shortness of breath COMPARISON: CXR: Two-view chest x-ray from same day INJECTION SITE:  Right antecubital fossa TECHNIQUE:    > Ventilatory images of the lungs were not performed as the patient was unable to tolerate this portion of the examination. > After intravenous administration of 6.6 mCi 99m Tc-MAA, perfusion images of the lungs were obtained in multiple projections. - Images are correlated with chest radiographic study which demonstrate no evidence of focal pulmonary infiltrate or pleural effusion.   ____________ FINDINGS: The distribution of radiopharmaceutical is within normal limits on perfusion images. There is no evidence of moderate or large subsegmental perfusion defect to indicate the presence of pulmonary embolism. ____________    IMPRESSION: 1. Ventilatory images are not performed as the patient wasn't able to tolerate this portion of the study. Perfusion examination within normal limits. No evidence of pulmonary embolism.               [facility]     CC: Nish Everett MD

## 2019-09-30 NOTE — PROGRESS NOTES
Transition of Care (DELFINO) Plan:  Chart reviewed, met with pt and then with caregiver in rm 327. She contacted [place] [id number] , pt representative regarding HH. Per caregiver, Lilia Louise, pt has RW at home he can use. FOC offered, Ms. Maris Bonilla chose [facility] if in network and [place] (PT) available to work with pt, otherwise she would like [facility] for follow up; referral placed to [facility] by CMS and [facility] rep contacted regarding request. Alexsandra Prince will see if they are able to assist.     [place] Transportation:       How is patient being transported at discharge? By Caregiver and  Rosie San and Crow Jewellcassandra     When? Is transport scheduled? Follow-up appointment and transportation:     PCP? See AVS     Specialist?     Time/Date? Who is transporting to the follow-up appointment? Either caregivers or Medicaid transportation      Is transport for follow up appointment scheduled? Communication plan (with patient/family): Who is being called? Patient or Next of Kin? Responsible party? What number(s) is to be used? Phone number verified on face sheet      What service provider is calling for [facility] services? When are they calling? 24-48hrs after discharge    Readmission Risk? (Green/Low; Yellow/Moderate; Red/High): green      Care Management Interventions  PCP Verified by CM:  Yes  Transition of Care Consult (CM Consult): [address]: No  Reason Outside [place]: Patient already serviced by other home care/hospice agency([facility] vs [facility])  Current Support Network: Has Personal Caregivers  Confirm Follow Up Transport: Other (see comment)(Medicaid transport)  Plan discussed with Pt/Family/Caregiver: Yes  Freedom of Choice Offered: Yes  Discharge Location  Discharge Placement: Home with home health

## 2019-10-23 ENCOUNTER — HOSPITAL ENCOUNTER (OUTPATIENT)
Dept: LAB | Age: 64
Discharge: HOME | End: 2019-10-23

## 2019-10-23 ENCOUNTER — HOSPITAL ENCOUNTER (OUTPATIENT)
Dept: LAB | Age: 64
Discharge: HOME | End: 2019-10-23
Payer: MEDICAID

## 2019-10-23 ENCOUNTER — HOSPITAL ENCOUNTER (OUTPATIENT)
Dept: CT IMAGING | Age: 64
Discharge: HOME | End: 2019-10-23
Payer: MEDICARE

## 2019-10-23 ENCOUNTER — HOSPITAL ENCOUNTER (OUTPATIENT)
Dept: LAB | Age: 64
Discharge: HOME | End: 2019-10-23
Payer: MEDICARE

## 2019-10-23 DIAGNOSIS — I63.033 CEREBRAL INFARCTION DUE TO BILATERAL THROMBOSIS OF CAROTID ARTERIES (HCC): ICD-10-CM

## 2019-10-23 LAB
ALBUMIN SERPL-MCNC: 4.4 G/DL (ref 3.4–5)
ALBUMIN/GLOB SERPL: 1.2 {RATIO} (ref 0.8–1.7)
ALP SERPL-CCNC: 79 U/L (ref 45–117)
ALT SERPL-CCNC: 24 U/L (ref 16–61)
ANION GAP SERPL CALC-SCNC: 4 MMOL/L (ref 3–18)
AST SERPL-CCNC: 21 U/L (ref 10–38)
BASOPHILS # BLD: 0 K/UL (ref 0–0.1)
BASOPHILS NFR BLD: 0 % (ref 0–2)
BILIRUB SERPL-MCNC: 0.8 MG/DL (ref 0.2–1)
BNP SERPL-MCNC: 43 PG/ML (ref 0–900)
BUN SERPL-MCNC: 23 MG/DL (ref 7–18)
BUN/CREAT SERPL: 17 (ref 12–20)
CALCIUM SERPL-MCNC: 9.2 MG/DL (ref 8.5–10.1)
CHLORIDE SERPL-SCNC: 109 MMOL/L (ref 100–111)
CHOLEST SERPL-MCNC: 183 MG/DL
CO2 SERPL-SCNC: 26 MMOL/L (ref 21–32)
CREAT SERPL-MCNC: 1.38 MG/DL (ref 0.6–1.3)
DIFFERENTIAL METHOD BLD: NORMAL
EOSINOPHIL # BLD: 0 K/UL (ref 0–0.4)
EOSINOPHIL NFR BLD: 0 % (ref 0–5)
ERYTHROCYTE [DISTWIDTH] IN BLOOD BY AUTOMATED COUNT: 12.5 % (ref 11.6–14.5)
ERYTHROCYTE [SEDIMENTATION RATE] IN BLOOD: 2 MM/HR (ref 0–20)
FOLATE SERPL-MCNC: 14.5 NG/ML (ref 3.1–17.5)
GLOBULIN SER CALC-MCNC: 3.8 G/DL (ref 2–4)
GLUCOSE SERPL-MCNC: 88 MG/DL (ref 74–99)
HCT VFR BLD AUTO: 46 % (ref 36–48)
HDLC SERPL-MCNC: 61 MG/DL (ref 40–60)
HDLC SERPL: 3 {RATIO} (ref 0–5)
HGB BLD-MCNC: 14.6 G/DL (ref 13–16)
LDLC SERPL CALC-MCNC: 108.4 MG/DL (ref 0–100)
LIPID PROFILE,FLP: ABNORMAL
LYMPHOCYTES # BLD: 1.3 K/UL (ref 0.9–3.6)
LYMPHOCYTES NFR BLD: 25 % (ref 21–52)
MCH RBC QN AUTO: 28.2 PG (ref 24–34)
MCHC RBC AUTO-ENTMCNC: 31.7 G/DL (ref 31–37)
MCV RBC AUTO: 88.8 FL (ref 74–97)
MONOCYTES # BLD: 0.3 K/UL (ref 0.05–1.2)
MONOCYTES NFR BLD: 7 % (ref 3–10)
NEUTS SEG # BLD: 3.5 K/UL (ref 1.8–8)
NEUTS SEG NFR BLD: 68 % (ref 40–73)
PLATELET # BLD AUTO: 170 K/UL (ref 135–420)
PMV BLD AUTO: 10.6 FL (ref 9.2–11.8)
POTASSIUM SERPL-SCNC: 4.3 MMOL/L (ref 3.5–5.5)
PROT SERPL-MCNC: 8.2 G/DL (ref 6.4–8.2)
RBC # BLD AUTO: 5.18 M/UL (ref 4.7–5.5)
SODIUM SERPL-SCNC: 139 MMOL/L (ref 136–145)
TRIGL SERPL-MCNC: 68 MG/DL (ref ?–150)
TSH SERPL DL<=0.05 MIU/L-ACNC: 1.16 UIU/ML (ref 0.36–3.74)
VALPROATE SERPL-MCNC: 100 UG/ML (ref 50–100)
VIT B12 SERPL-MCNC: 876 PG/ML (ref 211–911)
VLDLC SERPL CALC-MCNC: 13.6 MG/DL
WBC # BLD AUTO: 5.2 K/UL (ref 4.6–13.2)

## 2019-10-23 PROCEDURE — 80061 LIPID PANEL: CPT

## 2019-10-23 PROCEDURE — 86038 ANTINUCLEAR ANTIBODIES: CPT

## 2019-10-23 PROCEDURE — 85652 RBC SED RATE AUTOMATED: CPT

## 2019-10-23 PROCEDURE — 82746 ASSAY OF FOLIC ACID SERUM: CPT

## 2019-10-23 PROCEDURE — 82607 VITAMIN B-12: CPT

## 2019-10-23 PROCEDURE — 83880 ASSAY OF NATRIURETIC PEPTIDE: CPT

## 2019-10-23 PROCEDURE — 80053 COMPREHEN METABOLIC PANEL: CPT

## 2019-10-23 PROCEDURE — 70450 CT HEAD/BRAIN W/O DYE: CPT

## 2019-10-23 PROCEDURE — 85025 COMPLETE CBC W/AUTO DIFF WBC: CPT

## 2019-10-23 PROCEDURE — 84443 ASSAY THYROID STIM HORMONE: CPT

## 2019-10-23 PROCEDURE — 36415 COLL VENOUS BLD VENIPUNCTURE: CPT

## 2019-10-23 PROCEDURE — 86780 TREPONEMA PALLIDUM: CPT

## 2019-10-23 PROCEDURE — 80164 ASSAY DIPROPYLACETIC ACD TOT: CPT

## 2019-10-24 LAB
ANA TITR SER IF: NEGATIVE {TITER}
T PALLIDUM AB SER QL IF: NON REACTIVE

## 2019-10-28 LAB
FAX TO INFO,FAXT: NORMAL
FAX TO NUMBER,FAXN: NORMAL

## 2020-03-17 ENCOUNTER — HOSPITAL ENCOUNTER (OUTPATIENT)
Dept: LAB | Age: 65
Discharge: HOME | End: 2020-03-17
Payer: MEDICARE

## 2020-03-17 LAB
ERYTHROCYTE [SEDIMENTATION RATE] IN BLOOD: 1 MM/HR (ref 0–20)
FOLATE SERPL-MCNC: 9.8 NG/ML (ref 3.1–17.5)
TSH SERPL DL<=0.05 MIU/L-ACNC: 2.27 UIU/ML (ref 0.36–3.74)
VIT B12 SERPL-MCNC: 1151 PG/ML (ref 211–911)

## 2020-03-17 PROCEDURE — 86235 NUCLEAR ANTIGEN ANTIBODY: CPT

## 2020-03-17 PROCEDURE — 85652 RBC SED RATE AUTOMATED: CPT

## 2020-03-17 PROCEDURE — 82607 VITAMIN B-12: CPT

## 2020-03-17 PROCEDURE — 82390 ASSAY OF CERULOPLASMIN: CPT

## 2020-03-17 PROCEDURE — 86780 TREPONEMA PALLIDUM: CPT

## 2020-03-17 PROCEDURE — 82525 ASSAY OF COPPER: CPT

## 2020-03-17 PROCEDURE — 36415 COLL VENOUS BLD VENIPUNCTURE: CPT

## 2020-03-17 PROCEDURE — 84443 ASSAY THYROID STIM HORMONE: CPT

## 2020-03-18 LAB
CENTROMERE B AB SER-ACNC: <0.2 AI (ref 0–0.9)
CERULOPLASMIN SERPL-MCNC: 23.1 MG/DL (ref 16–31)
CHROMATIN AB SERPL-ACNC: <0.2 AI (ref 0–0.9)
DSDNA AB SER-ACNC: 1 IU/ML (ref 0–9)
ENA JO1 AB SER-ACNC: <0.2 AI (ref 0–0.9)
ENA RNP AB SER-ACNC: 0.4 AI (ref 0–0.9)
ENA SCL70 AB SER-ACNC: 1.1 AI (ref 0–0.9)
ENA SM AB SER-ACNC: <0.2 AI (ref 0–0.9)
ENA SS-A AB SER-ACNC: <0.2 AI (ref 0–0.9)
ENA SS-B AB SER-ACNC: <0.2 AI (ref 0–0.9)
SEE BELOW, 164869: ABNORMAL
T PALLIDUM AB SER QL IF: NON REACTIVE

## 2020-03-19 LAB
COPPER SERPL-MCNC: 106 UG/DL (ref 72–166)
FAX TO INFO,FAXT: NORMAL
FAX TO NUMBER,FAXN: NORMAL

## 2021-03-22 ENCOUNTER — TRANSCRIBE ORDER (OUTPATIENT)
Dept: REGISTRATION | Age: 66
End: 2021-03-22

## 2021-03-22 ENCOUNTER — HOSPITAL ENCOUNTER (OUTPATIENT)
Dept: GENERAL RADIOLOGY | Age: 66
Discharge: HOME | End: 2021-03-22
Payer: MEDICARE

## 2021-03-22 DIAGNOSIS — M79.641 RIGHT HAND PAIN: ICD-10-CM

## 2021-03-22 DIAGNOSIS — M25.561 RIGHT KNEE PAIN: ICD-10-CM

## 2021-03-22 DIAGNOSIS — M25.561 RIGHT KNEE PAIN: Primary | ICD-10-CM

## 2021-03-22 PROCEDURE — 73130 X-RAY EXAM OF HAND: CPT

## 2021-03-22 PROCEDURE — 73562 X-RAY EXAM OF KNEE 3: CPT
